# Patient Record
Sex: MALE | Race: WHITE | Employment: OTHER | URBAN - METROPOLITAN AREA
[De-identification: names, ages, dates, MRNs, and addresses within clinical notes are randomized per-mention and may not be internally consistent; named-entity substitution may affect disease eponyms.]

---

## 2017-06-13 ENCOUNTER — TELEPHONE (OUTPATIENT)
Dept: SURGERY | Age: 67
End: 2017-06-13

## 2017-08-10 ENCOUNTER — OFFICE VISIT (OUTPATIENT)
Dept: SURGERY | Age: 67
End: 2017-08-10

## 2017-08-10 ENCOUNTER — OFFICE VISIT (OUTPATIENT)
Dept: ORTHOPEDIC SURGERY | Age: 67
End: 2017-08-10

## 2017-08-10 VITALS — BODY MASS INDEX: 23.14 KG/M2 | WEIGHT: 190 LBS | HEIGHT: 76 IN | RESPIRATION RATE: 16 BRPM

## 2017-08-10 VITALS
WEIGHT: 190.04 LBS | HEIGHT: 76 IN | BODY MASS INDEX: 23.14 KG/M2 | DIASTOLIC BLOOD PRESSURE: 72 MMHG | SYSTOLIC BLOOD PRESSURE: 126 MMHG | HEART RATE: 76 BPM | RESPIRATION RATE: 16 BRPM

## 2017-08-10 DIAGNOSIS — L98.9 SKIN LESION: Primary | ICD-10-CM

## 2017-08-10 DIAGNOSIS — R52 PAIN: ICD-10-CM

## 2017-08-10 DIAGNOSIS — M75.81 ROTATOR CUFF TENDONITIS, RIGHT: Primary | ICD-10-CM

## 2017-08-10 PROCEDURE — 73010 X-RAY EXAM OF SHOULDER BLADE: CPT | Performed by: ORTHOPAEDIC SURGERY

## 2017-08-10 PROCEDURE — 3017F COLORECTAL CA SCREEN DOC REV: CPT | Performed by: ORTHOPAEDIC SURGERY

## 2017-08-10 PROCEDURE — 4004F PT TOBACCO SCREEN RCVD TLK: CPT | Performed by: ORTHOPAEDIC SURGERY

## 2017-08-10 PROCEDURE — 99204 OFFICE O/P NEW MOD 45 MIN: CPT | Performed by: ORTHOPAEDIC SURGERY

## 2017-08-10 PROCEDURE — G8420 CALC BMI NORM PARAMETERS: HCPCS | Performed by: SURGERY

## 2017-08-10 PROCEDURE — 4040F PNEUMOC VAC/ADMIN/RCVD: CPT | Performed by: ORTHOPAEDIC SURGERY

## 2017-08-10 PROCEDURE — 20610 DRAIN/INJ JOINT/BURSA W/O US: CPT | Performed by: ORTHOPAEDIC SURGERY

## 2017-08-10 PROCEDURE — 1123F ACP DISCUSS/DSCN MKR DOCD: CPT | Performed by: ORTHOPAEDIC SURGERY

## 2017-08-10 PROCEDURE — 4040F PNEUMOC VAC/ADMIN/RCVD: CPT | Performed by: SURGERY

## 2017-08-10 PROCEDURE — 4004F PT TOBACCO SCREEN RCVD TLK: CPT | Performed by: SURGERY

## 2017-08-10 PROCEDURE — G8420 CALC BMI NORM PARAMETERS: HCPCS | Performed by: ORTHOPAEDIC SURGERY

## 2017-08-10 PROCEDURE — G8427 DOCREV CUR MEDS BY ELIG CLIN: HCPCS | Performed by: SURGERY

## 2017-08-10 PROCEDURE — G8428 CUR MEDS NOT DOCUMENT: HCPCS | Performed by: ORTHOPAEDIC SURGERY

## 2017-08-10 PROCEDURE — 3017F COLORECTAL CA SCREEN DOC REV: CPT | Performed by: SURGERY

## 2017-08-10 PROCEDURE — 1123F ACP DISCUSS/DSCN MKR DOCD: CPT | Performed by: SURGERY

## 2017-08-10 PROCEDURE — 73030 X-RAY EXAM OF SHOULDER: CPT | Performed by: ORTHOPAEDIC SURGERY

## 2017-08-10 PROCEDURE — 99212 OFFICE O/P EST SF 10 MIN: CPT | Performed by: SURGERY

## 2017-08-10 ASSESSMENT — ENCOUNTER SYMPTOMS
BACK PAIN: 1
EYES NEGATIVE: 1
SHORTNESS OF BREATH: 1
GASTROINTESTINAL NEGATIVE: 1

## 2017-08-22 ENCOUNTER — HOSPITAL ENCOUNTER (OUTPATIENT)
Dept: PHYSICAL THERAPY | Age: 67
Discharge: HOME | End: 2017-08-31
Attending: ORTHOPAEDIC SURGERY | Admitting: ORTHOPAEDIC SURGERY

## 2017-08-22 ASSESSMENT — PAIN DESCRIPTION - LOCATION: LOCATION: SHOULDER

## 2017-08-22 ASSESSMENT — PAIN DESCRIPTION - ORIENTATION: ORIENTATION: RIGHT

## 2017-08-22 ASSESSMENT — PAIN DESCRIPTION - PROGRESSION: CLINICAL_PROGRESSION: GRADUALLY IMPROVING

## 2017-08-22 ASSESSMENT — PAIN DESCRIPTION - DESCRIPTORS: DESCRIPTORS: ACHING;SHARP

## 2017-08-22 ASSESSMENT — PAIN DESCRIPTION - PAIN TYPE: TYPE: CHRONIC PAIN

## 2017-08-22 ASSESSMENT — PAIN SCALES - GENERAL: PAINLEVEL_OUTOF10: 0

## 2017-08-22 ASSESSMENT — PAIN DESCRIPTION - FREQUENCY: FREQUENCY: INTERMITTENT

## 2017-08-31 ENCOUNTER — HOSPITAL ENCOUNTER (OUTPATIENT)
Dept: PHYSICAL THERAPY | Age: 67
Discharge: HOME | End: 2017-08-31
Admitting: ORTHOPAEDIC SURGERY

## 2017-09-01 ENCOUNTER — HOSPITAL ENCOUNTER (OUTPATIENT)
Dept: OTHER | Age: 67
Discharge: HOME | End: 2017-09-30
Attending: ORTHOPAEDIC SURGERY | Admitting: ORTHOPAEDIC SURGERY

## 2017-09-05 ENCOUNTER — HOSPITAL ENCOUNTER (OUTPATIENT)
Dept: PHYSICAL THERAPY | Age: 67
Discharge: HOME | End: 2017-09-05
Admitting: ORTHOPAEDIC SURGERY

## 2017-09-07 ENCOUNTER — HOSPITAL ENCOUNTER (OUTPATIENT)
Dept: PHYSICAL THERAPY | Age: 67
Discharge: HOME | End: 2017-09-07
Admitting: ORTHOPAEDIC SURGERY

## 2017-09-11 ENCOUNTER — HOSPITAL ENCOUNTER (OUTPATIENT)
Dept: PHYSICAL THERAPY | Age: 67
Discharge: HOME | End: 2017-09-11
Admitting: ORTHOPAEDIC SURGERY

## 2017-09-14 ENCOUNTER — HOSPITAL ENCOUNTER (OUTPATIENT)
Dept: PHYSICAL THERAPY | Age: 67
Discharge: HOME | End: 2017-09-14
Admitting: ORTHOPAEDIC SURGERY

## 2017-09-26 ENCOUNTER — OFFICE VISIT (OUTPATIENT)
Dept: ORTHOPEDIC SURGERY | Age: 67
End: 2017-09-26

## 2017-09-26 VITALS — HEIGHT: 75 IN | RESPIRATION RATE: 16 BRPM | WEIGHT: 190 LBS | BODY MASS INDEX: 23.62 KG/M2

## 2017-09-26 DIAGNOSIS — M75.101 TEAR OF RIGHT ROTATOR CUFF, UNSPECIFIED TEAR EXTENT: Primary | ICD-10-CM

## 2017-09-26 PROCEDURE — 1123F ACP DISCUSS/DSCN MKR DOCD: CPT | Performed by: ORTHOPAEDIC SURGERY

## 2017-09-26 PROCEDURE — 4040F PNEUMOC VAC/ADMIN/RCVD: CPT | Performed by: ORTHOPAEDIC SURGERY

## 2017-09-26 PROCEDURE — G8420 CALC BMI NORM PARAMETERS: HCPCS | Performed by: ORTHOPAEDIC SURGERY

## 2017-09-26 PROCEDURE — G8427 DOCREV CUR MEDS BY ELIG CLIN: HCPCS | Performed by: ORTHOPAEDIC SURGERY

## 2017-09-26 PROCEDURE — 4004F PT TOBACCO SCREEN RCVD TLK: CPT | Performed by: ORTHOPAEDIC SURGERY

## 2017-09-26 PROCEDURE — 3017F COLORECTAL CA SCREEN DOC REV: CPT | Performed by: ORTHOPAEDIC SURGERY

## 2017-09-26 PROCEDURE — 99213 OFFICE O/P EST LOW 20 MIN: CPT | Performed by: ORTHOPAEDIC SURGERY

## 2017-09-26 RX ORDER — TAMSULOSIN HYDROCHLORIDE 0.4 MG/1
CAPSULE ORAL
Refills: 6 | COMMUNITY
Start: 2017-08-30

## 2017-09-26 RX ORDER — CEFDINIR 300 MG/1
CAPSULE ORAL
Refills: 0 | COMMUNITY
Start: 2017-08-08 | End: 2019-02-28

## 2017-10-01 ENCOUNTER — HOSPITAL ENCOUNTER (OUTPATIENT)
Dept: OTHER | Age: 67
Discharge: HOME | End: 2017-10-31
Attending: ORTHOPAEDIC SURGERY | Admitting: ORTHOPAEDIC SURGERY

## 2017-10-11 ENCOUNTER — HOSPITAL ENCOUNTER (OUTPATIENT)
Dept: GENERAL RADIOLOGY | Age: 67
Discharge: HOME | End: 2017-10-11
Attending: ORTHOPAEDIC SURGERY | Admitting: ORTHOPAEDIC SURGERY

## 2017-10-11 ENCOUNTER — HOSPITAL ENCOUNTER (OUTPATIENT)
Dept: CT IMAGING | Age: 67
Discharge: HOME | End: 2017-10-11
Attending: ORTHOPAEDIC SURGERY | Admitting: ORTHOPAEDIC SURGERY

## 2017-10-11 DIAGNOSIS — M75.101 TEAR OF RIGHT ROTATOR CUFF, UNSPECIFIED TEAR EXTENT: ICD-10-CM

## 2017-10-31 ENCOUNTER — OFFICE VISIT (OUTPATIENT)
Dept: ORTHOPEDIC SURGERY | Age: 67
End: 2017-10-31

## 2017-10-31 VITALS — WEIGHT: 190 LBS | RESPIRATION RATE: 16 BRPM | HEIGHT: 75 IN | BODY MASS INDEX: 23.62 KG/M2

## 2017-10-31 DIAGNOSIS — M75.101 TEAR OF RIGHT ROTATOR CUFF, UNSPECIFIED TEAR EXTENT: Primary | ICD-10-CM

## 2017-10-31 PROCEDURE — 4004F PT TOBACCO SCREEN RCVD TLK: CPT | Performed by: ORTHOPAEDIC SURGERY

## 2017-10-31 PROCEDURE — 3017F COLORECTAL CA SCREEN DOC REV: CPT | Performed by: ORTHOPAEDIC SURGERY

## 2017-10-31 PROCEDURE — 4040F PNEUMOC VAC/ADMIN/RCVD: CPT | Performed by: ORTHOPAEDIC SURGERY

## 2017-10-31 PROCEDURE — 1123F ACP DISCUSS/DSCN MKR DOCD: CPT | Performed by: ORTHOPAEDIC SURGERY

## 2017-10-31 PROCEDURE — G8420 CALC BMI NORM PARAMETERS: HCPCS | Performed by: ORTHOPAEDIC SURGERY

## 2017-10-31 PROCEDURE — 99213 OFFICE O/P EST LOW 20 MIN: CPT | Performed by: ORTHOPAEDIC SURGERY

## 2017-10-31 PROCEDURE — G8427 DOCREV CUR MEDS BY ELIG CLIN: HCPCS | Performed by: ORTHOPAEDIC SURGERY

## 2017-10-31 PROCEDURE — G8484 FLU IMMUNIZE NO ADMIN: HCPCS | Performed by: ORTHOPAEDIC SURGERY

## 2017-10-31 RX ORDER — BUPROPION HYDROCHLORIDE 100 MG/1
TABLET, EXTENDED RELEASE ORAL
Refills: 5 | COMMUNITY
Start: 2017-10-23 | End: 2019-02-28

## 2017-10-31 RX ORDER — MEDICAL SUPPLY, MISCELLANEOUS
EACH MISCELLANEOUS
Refills: 3 | COMMUNITY
Start: 2017-10-25 | End: 2019-02-28

## 2017-10-31 RX ORDER — HYDROCODONE BITARTRATE AND ACETAMINOPHEN 5; 325 MG/1; MG/1
TABLET ORAL
Refills: 0 | COMMUNITY
Start: 2017-10-23 | End: 2018-01-09 | Stop reason: ALTCHOICE

## 2017-10-31 RX ORDER — INFLUENZA A VIRUS A/MICHIGAN/45/2015 X-275 (H1N1) ANTIGEN (FORMALDEHYDE INACTIVATED), INFLUENZA A VIRUS A/SINGAPORE/INFIMH-16-0019/2016 IVR-186 (H3N2) ANTIGEN (FORMALDEHYDE INACTIVATED), AND INFLUENZA B VIRUS B/MARYLAND/15/2016 BX-69A (A B/COLORADO/6/2017-LIKE VIRUS) ANTIGEN (FORMALDEHYDE INACTIVATED) 60; 60; 60 UG/.5ML; UG/.5ML; UG/.5ML
INJECTION, SUSPENSION INTRAMUSCULAR
Refills: 0 | COMMUNITY
Start: 2017-10-02 | End: 2019-02-28

## 2017-10-31 RX ORDER — FUROSEMIDE 20 MG/1
TABLET ORAL
Refills: 5 | COMMUNITY
Start: 2017-10-23

## 2017-10-31 RX ORDER — ROFLUMILAST 500 UG/1
TABLET ORAL
Refills: 5 | COMMUNITY
Start: 2017-10-23 | End: 2019-02-28

## 2017-10-31 NOTE — PROGRESS NOTES
Concern    None     Social History Narrative    None       Current Outpatient Prescriptions   Medication Sig Dispense Refill    CVS ASPIRIN EC 81 MG EC tablet TAKE 1 TABLET (81 MG) BY MOUTH DAILY  3    buPROPion (WELLBUTRIN SR) 100 MG extended release tablet TAKE 1 TABLET (100 MG) BY MOUTH DAILY IN THE MORNING  5    [facility]S VITAMIN D3 1000 units CAPS TAKE 1 CAPSULE (1,000 UNITS) BY MOUTH DAILY  3    furosemide (LASIX) 20 MG tablet TAKE 1 TABLET (20 MG) BY MOUTH DAILY  5    HYDROcodone-acetaminophen (NORCO) 5-325 MG per tablet TAKE 1 TABLET BY MOUTH EVERY DAY AS NEEDED FOR PAIN  0    FLUZONE HIGH-DOSE 0.5 ML KELLEN injection TO BE ADMINISTERED BY PHARMACIST FOR IMMUNIZATION  0    DALIRESP 500 MCG tablet TAKE 1 TABLET (500 MCG) BY MOUTH DAILY  5    cefdinir (OMNICEF) 300 MG capsule TAKE 1 CAPSULE (300 MG) BY MOUTH EVERY 12 HOURS FOR 10 DAYS  0    magnesium oxide (MAG-OX) 400 (241.3 Mg) MG TABS tablet TAKE 1 TABLET BY MOUTH EVERY DAY  3    tamsulosin (FLOMAX) 0.4 MG capsule TAKE 1 CAPSULE (0.4 MG) BY MOUTH ONCE A DAY AT BED TIME  6    naproxen (NAPROSYN) 500 MG tablet Take 500 mg by mouth 2 times daily (with meals)      calcium-vitamin D (OSCAL-500) 500-200 MG-UNIT per tablet Take 1 tablet by mouth daily      ipratropium-albuterol (DUONEB) 0.5-2.5 (3) MG/3ML SOLN nebulizer solution   Take 1 vial by nebulization every 4 hours   5    gabapentin (NEURONTIN) 300 MG capsule   Take 300 mg by mouth 2 times daily       multivitamin (THERAGRAN) per tablet   Take 1 tablet by mouth daily       PROAIR  (90 BASE) MCG/ACT inhaler   Inhale 2 puffs into the lungs every 4 hours as needed        No current facility-administered medications for this visit. Allergies   Allergen Reactions    Tramadol      \"It just makes me feel bad\".        Review of Systems:  See above      Physical Exam:   Resp 16   Ht 6' 3\" (1.905 m)   Wt 190 lb (86.2 kg)   BMI 23.75 kg/m²    He is in no acute distress, is awake, alert, oriented X 3, has appropriate mood and affect, and demonstrates normal gait and station. Right shoulder exam:  Skin:  Clear with no erythema, there is no significant joint effusion  Deformity:  none  Atrophy:  none  Tenderness:  lateral acromial  Active ROM:   FE:160    IR side: T12           ER side: 40   ER abduction: 60   IR abduction:  +10  Passive ROM is the same as active except FE is 170  ROM in the opposite shoulder is the same  Strength: FE:5-/5     ER:5-/5 IR:5/5      Elbow flexion:  5/5  Neer:  moderately positive  Alcocer:  moderately positive  Yergason:  mildly positive      The cervical spine does not have tenderness to palpation and there is not a Spurling's sign. Unless otherwise noted above there are no skin lesions, there is normal soft touch sensibility and 5/5 motor function in the C5-T1 distribution in the arm. There are bilateral +2 radial pulses and good capillary refill in the hands. Imaging review:   reviewed (2 views of the Right shoulder, 2V scaplula) and show  Slightly high riding humeral head, no fracture, no DJD      CT RIGHT SHOULDER 10/11/17      Impression   1. Full-thickness full width tears of the supraspinatus and infraspinatus   tendons retracted to the medial aspect of the humeral head. 2. Moderate acromioclavicular degenerative changes with mild subacromial   narrowing secondary to superior migration of the humeral head. 3. Mild glenohumeral degenerative changes. 4. No discrete labral tear or paralabral ganglion. 5. Nonvisualization of the intra-articular portion of the long head biceps   tendon raising the possibility of a tear and distal retraction. 6. Mild to moderate emphysematous changes.             Impression:  Right shoulder recurrent rotator cuff tear    Plan:  Natural history and expected course discussed. Questions answered.    We had a lengthy discussion about possible resurgery versus nonoperative management with physical therapy and periodic steroid injections. He thinks he would like to proceed with surgery, but would like to wait a few months.   Keep arm strong   Follow up  Dec-Luke to discuss possible surgery

## 2017-11-01 ENCOUNTER — HOSPITAL ENCOUNTER (OUTPATIENT)
Dept: OTHER | Age: 67
Discharge: HOME | End: 2017-11-30
Attending: ORTHOPAEDIC SURGERY | Admitting: ORTHOPAEDIC SURGERY

## 2018-12-05 ENCOUNTER — HOSPITAL ENCOUNTER (OUTPATIENT)
Age: 68
Discharge: HOME | End: 2018-12-05
Payer: COMMERCIAL

## 2018-12-05 ENCOUNTER — HOSPITAL ENCOUNTER (OUTPATIENT)
Dept: GENERAL RADIOLOGY | Age: 68
Discharge: HOME | End: 2018-12-05
Payer: COMMERCIAL

## 2018-12-05 DIAGNOSIS — R06.09 DOE (DYSPNEA ON EXERTION): ICD-10-CM

## 2018-12-05 DIAGNOSIS — J44.9 OBSTRUCTIVE CHRONIC BRONCHITIS WITHOUT EXACERBATION (HCC): ICD-10-CM

## 2018-12-05 LAB
ALBUMIN SERPL-MCNC: 4.5 GM/DL (ref 3.4–5)
ALP BLD-CCNC: 89 IU/L (ref 40–128)
ALT SERPL-CCNC: 26 U/L (ref 10–40)
ANION GAP SERPL CALCULATED.3IONS-SCNC: 17 MMOL/L (ref 4–16)
AST SERPL-CCNC: 31 IU/L (ref 15–37)
BASOPHILS ABSOLUTE: 0.1 K/CU MM
BASOPHILS RELATIVE PERCENT: 1.2 % (ref 0–1)
BILIRUB SERPL-MCNC: 0.5 MG/DL (ref 0–1)
BUN BLDV-MCNC: 27 MG/DL (ref 6–23)
CALCIUM SERPL-MCNC: 9.6 MG/DL (ref 8.3–10.6)
CHLORIDE BLD-SCNC: 101 MMOL/L (ref 99–110)
CO2: 24 MMOL/L (ref 21–32)
CREAT SERPL-MCNC: 0.8 MG/DL (ref 0.9–1.3)
D DIMER: <200 NG/ML(DDU)
DIFFERENTIAL TYPE: ABNORMAL
EOSINOPHILS ABSOLUTE: 0.3 K/CU MM
EOSINOPHILS RELATIVE PERCENT: 4.3 % (ref 0–3)
GFR AFRICAN AMERICAN: >60 ML/MIN/1.73M2
GFR NON-AFRICAN AMERICAN: >60 ML/MIN/1.73M2
GLUCOSE BLD-MCNC: 70 MG/DL (ref 70–99)
HCT VFR BLD CALC: 47.4 % (ref 42–52)
HEMOGLOBIN: 15.2 GM/DL (ref 13.5–18)
IMMATURE NEUTROPHIL %: 0.3 % (ref 0–0.43)
LYMPHOCYTES ABSOLUTE: 1 K/CU MM
LYMPHOCYTES RELATIVE PERCENT: 14.7 % (ref 24–44)
MCH RBC QN AUTO: 31.5 PG (ref 27–31)
MCHC RBC AUTO-ENTMCNC: 32.1 % (ref 32–36)
MCV RBC AUTO: 98.3 FL (ref 78–100)
MONOCYTES ABSOLUTE: 0.8 K/CU MM
MONOCYTES RELATIVE PERCENT: 12 % (ref 0–4)
NUCLEATED RBC %: 0 %
PDW BLD-RTO: 13.4 % (ref 11.7–14.9)
PLATELET # BLD: 250 K/CU MM (ref 140–440)
PMV BLD AUTO: 9.4 FL (ref 7.5–11.1)
POTASSIUM SERPL-SCNC: 5.3 MMOL/L (ref 3.5–5.1)
PRO-BNP: 89.69 PG/ML
RBC # BLD: 4.82 M/CU MM (ref 4.6–6.2)
SEGMENTED NEUTROPHILS ABSOLUTE COUNT: 4.7 K/CU MM
SEGMENTED NEUTROPHILS RELATIVE PERCENT: 67.5 % (ref 36–66)
SODIUM BLD-SCNC: 142 MMOL/L (ref 135–145)
TOTAL IMMATURE NEUTOROPHIL: 0.02 K/CU MM
TOTAL NUCLEATED RBC: 0 K/CU MM
TOTAL PROTEIN: 7 GM/DL (ref 6.4–8.2)
TSH HIGH SENSITIVITY: 1.23 UIU/ML (ref 0.27–4.2)
WBC # BLD: 6.9 K/CU MM (ref 4–10.5)

## 2018-12-05 PROCEDURE — 71046 X-RAY EXAM CHEST 2 VIEWS: CPT

## 2018-12-05 PROCEDURE — 84443 ASSAY THYROID STIM HORMONE: CPT

## 2018-12-05 PROCEDURE — 83880 ASSAY OF NATRIURETIC PEPTIDE: CPT

## 2018-12-05 PROCEDURE — 80053 COMPREHEN METABOLIC PANEL: CPT

## 2018-12-05 PROCEDURE — 85379 FIBRIN DEGRADATION QUANT: CPT

## 2018-12-05 PROCEDURE — 85025 COMPLETE CBC W/AUTO DIFF WBC: CPT

## 2018-12-05 PROCEDURE — 36415 COLL VENOUS BLD VENIPUNCTURE: CPT

## 2019-02-21 ENCOUNTER — HOSPITAL ENCOUNTER (OUTPATIENT)
Dept: ULTRASOUND IMAGING | Age: 69
Discharge: HOME | End: 2019-02-21
Payer: COMMERCIAL

## 2019-02-21 DIAGNOSIS — R60.0 LOCALIZED EDEMA: ICD-10-CM

## 2019-02-21 DIAGNOSIS — I83.12 VARICOSE VEINS OF BOTH LOWER EXTREMITIES WITH INFLAMMATION: ICD-10-CM

## 2019-02-21 DIAGNOSIS — I83.11 VARICOSE VEINS OF BOTH LOWER EXTREMITIES WITH INFLAMMATION: ICD-10-CM

## 2019-02-21 PROCEDURE — 93970 EXTREMITY STUDY: CPT

## 2019-02-28 ENCOUNTER — OFFICE VISIT (OUTPATIENT)
Dept: SURGERY | Age: 69
End: 2019-02-28
Payer: COMMERCIAL

## 2019-02-28 VITALS
BODY MASS INDEX: 25.06 KG/M2 | OXYGEN SATURATION: 92 % | DIASTOLIC BLOOD PRESSURE: 64 MMHG | WEIGHT: 205.8 LBS | RESPIRATION RATE: 16 BRPM | HEART RATE: 52 BPM | SYSTOLIC BLOOD PRESSURE: 142 MMHG | HEIGHT: 76 IN

## 2019-02-28 DIAGNOSIS — I73.9 PAD (PERIPHERAL ARTERY DISEASE) (HCC): Primary | ICD-10-CM

## 2019-02-28 DIAGNOSIS — I49.9 IRREGULAR HEART BEAT: ICD-10-CM

## 2019-02-28 PROCEDURE — 1123F ACP DISCUSS/DSCN MKR DOCD: CPT | Performed by: SURGERY

## 2019-02-28 PROCEDURE — 3017F COLORECTAL CA SCREEN DOC REV: CPT | Performed by: SURGERY

## 2019-02-28 PROCEDURE — 1036F TOBACCO NON-USER: CPT | Performed by: SURGERY

## 2019-02-28 PROCEDURE — G8484 FLU IMMUNIZE NO ADMIN: HCPCS | Performed by: SURGERY

## 2019-02-28 PROCEDURE — G8419 CALC BMI OUT NRM PARAM NOF/U: HCPCS | Performed by: SURGERY

## 2019-02-28 PROCEDURE — G8427 DOCREV CUR MEDS BY ELIG CLIN: HCPCS | Performed by: SURGERY

## 2019-02-28 PROCEDURE — 4040F PNEUMOC VAC/ADMIN/RCVD: CPT | Performed by: SURGERY

## 2019-02-28 PROCEDURE — 99204 OFFICE O/P NEW MOD 45 MIN: CPT | Performed by: SURGERY

## 2019-02-28 RX ORDER — TIZANIDINE 2 MG/1
2 TABLET ORAL EVERY 6 HOURS PRN
COMMUNITY

## 2019-02-28 NOTE — PROGRESS NOTES
Mother         cervical    Other Mother         lung problems    Cancer Father         bone marrow    Early Death Brother         GSW    Arthritis Brother      Social History     Socioeconomic History    Marital status: Single     Spouse name: Not on file    Number of children: Not on file    Years of education: Not on file    Highest education level: Not on file   Occupational History    Not on file   Social Needs    Financial resource strain: Not on file    Food insecurity:     Worry: Not on file     Inability: Not on file    Transportation needs:     Medical: Not on file     Non-medical: Not on file   Tobacco Use    Smoking status: Former Smoker     Packs/day: 1.00     Years: 40.00     Pack years: 40.00    Smokeless tobacco: Never Used   Substance and Sexual Activity    Alcohol use: Yes     Alcohol/week: 0.6 oz     Types: 1 Cans of beer per week     Comment: few beers in the evening. CAFFEINE: 1-2 cups coffee daily.     Drug use: No    Sexual activity: Yes     Partners: Female   Lifestyle    Physical activity:     Days per week: Not on file     Minutes per session: Not on file    Stress: Not on file   Relationships    Social connections:     Talks on phone: Not on file     Gets together: Not on file     Attends Muslim service: Not on file     Active member of club or organization: Not on file     Attends meetings of clubs or organizations: Not on file     Relationship status: Not on file    Intimate partner violence:     Fear of current or ex partner: Not on file     Emotionally abused: Not on file     Physically abused: Not on file     Forced sexual activity: Not on file   Other Topics Concern    Not on file   Social History Narrative    Not on file       Current Outpatient Medications   Medication Sig Dispense Refill    tiZANidine (ZANAFLEX) 2 MG tablet Take 2 mg by mouth every 6 hours as needed      B Complex-Biotin-FA ([facility]S BALANCED B-100) TABS TAKE 1 TABLET (100 MG) BY MOUTH Respiratory: Negative for apnea, choking and stridor. Cardiovascular: Negative for chest pain and palpitations. Gastrointestinal: Negative for anal bleeding, constipation and rectal pain. Endocrine: Negative for polydipsia. Genitourinary: Negative for enuresis, flank pain and hematuria. Musculoskeletal: Positive for gait problem and joint swelling. Negative for back pain and myalgias. Skin: Negative for color change and pallor. Allergic/Immunologic: Negative for environmental allergies. Neurological: Negative for syncope and speech difficulty. Psychiatric/Behavioral: Negative for confusion and hallucinations. OBJECTIVE:  Physical Exam:    BP (!) 142/64 (Site: Left Upper Arm, Position: Sitting, Cuff Size: Medium Adult)   Pulse 52   Resp 16   Ht 6' 4\" (1.93 m)   Wt 205 lb 12.8 oz (93.4 kg)   SpO2 92%   BMI 25.05 kg/m²      Physical Exam   Constitutional: He is oriented to person, place, and time. He appears well-developed and well-nourished. HENT:   Head: Normocephalic. Eyes: Pupils are equal, round, and reactive to light. Neck: Normal range of motion. Neck supple. Cardiovascular: Normal rate. Pulmonary/Chest: Effort normal.   Abdominal: Soft. He exhibits no distension and no mass. There is no tenderness. There is no rebound and no guarding. Musculoskeletal: Normal range of motion. Right shoulder: He exhibits swelling. Right knee: He exhibits swelling. Left knee: He exhibits swelling. Neurological: He is alert and oriented to person, place, and time. Skin: Skin is warm. Psychiatric: He has a normal mood and affect. ASSESSMENT:  1. PAD (peripheral artery disease) ([facility] [address]    2. Irregular heart beat          PLAN:  Treatment:  Pt needs vascular evaluation for venous stasis. Will also refer to cardiology for poss CHF. Patient counseled on risks, benefits, and alternatives of treatment plan at length today.  Patient states an understanding and willingness to proceed with plan. Orders Placed This Encounter   Procedures   Kvng Schmitz MD, Cardiology, [place]        No orders of the defined types were placed in this encounter. Follow Up:  No follow-ups on file.       Winifred Hartman MD

## 2019-03-01 ENCOUNTER — TELEPHONE (OUTPATIENT)
Dept: CARDIOLOGY CLINIC | Age: 69
End: 2019-03-01

## 2019-03-06 ENCOUNTER — OFFICE VISIT (OUTPATIENT)
Dept: PULMONOLOGY | Age: 69
End: 2019-03-06
Payer: COMMERCIAL

## 2019-03-06 VITALS — HEART RATE: 68 BPM | OXYGEN SATURATION: 96 %

## 2019-03-06 DIAGNOSIS — Z87.891 FORMER SMOKER: ICD-10-CM

## 2019-03-06 DIAGNOSIS — J43.9 PULMONARY EMPHYSEMA, UNSPECIFIED EMPHYSEMA TYPE (HCC): ICD-10-CM

## 2019-03-06 DIAGNOSIS — J44.9 COPD, SEVERE (HCC): ICD-10-CM

## 2019-03-06 DIAGNOSIS — R06.02 SHORTNESS OF BREATH: Primary | ICD-10-CM

## 2019-03-06 LAB
EXPIRATORY TIME-POST: NORMAL SEC
EXPIRATORY TIME: NORMAL SEC
FEF 25-75% %CHNG: NORMAL
FEF 25-75% %PRED-POST: NORMAL %
FEF 25-75% %PRED-PRE: NORMAL L/SEC
FEF 25-75% PRED: NORMAL L/SEC
FEF 25-75%-POST: NORMAL L/SEC
FEF 25-75%-PRE: NORMAL L/SEC
FEV1 %PRED-POST: 30 %
FEV1 %PRED-PRE: 34 %
FEV1 PRED: 4.12 L
FEV1-POST: 1.24 L
FEV1-PRE: 1.38 L
FEV1/FVC %PRED-POST: 60 %
FEV1/FVC %PRED-PRE: 61 %
FEV1/FVC PRED: 74 %
FEV1/FVC-POST: 44.4 %
FEV1/FVC-PRE: 45.3 %
FVC %PRED-POST: 51 L
FVC %PRED-PRE: 55 %
FVC PRED: 5.55 L
FVC-POST: 2.8 L
FVC-PRE: 3.05 L
PEF %PRED-POST: NORMAL %
PEF %PRED-PRE: NORMAL L/SEC
PEF PRED: NORMAL L/SEC
PEF%CHNG: NORMAL
PEF-POST: NORMAL L/SEC
PEF-PRE: NORMAL L/SEC

## 2019-03-06 PROCEDURE — 99203 OFFICE O/P NEW LOW 30 MIN: CPT | Performed by: INTERNAL MEDICINE

## 2019-03-06 PROCEDURE — 3023F SPIROM DOC REV: CPT | Performed by: INTERNAL MEDICINE

## 2019-03-06 PROCEDURE — G8419 CALC BMI OUT NRM PARAM NOF/U: HCPCS | Performed by: INTERNAL MEDICINE

## 2019-03-06 PROCEDURE — 1123F ACP DISCUSS/DSCN MKR DOCD: CPT | Performed by: INTERNAL MEDICINE

## 2019-03-06 PROCEDURE — 1036F TOBACCO NON-USER: CPT | Performed by: INTERNAL MEDICINE

## 2019-03-06 PROCEDURE — 1101F PT FALLS ASSESS-DOCD LE1/YR: CPT | Performed by: INTERNAL MEDICINE

## 2019-03-06 PROCEDURE — 3017F COLORECTAL CA SCREEN DOC REV: CPT | Performed by: INTERNAL MEDICINE

## 2019-03-06 PROCEDURE — G8484 FLU IMMUNIZE NO ADMIN: HCPCS | Performed by: INTERNAL MEDICINE

## 2019-03-06 PROCEDURE — 4040F PNEUMOC VAC/ADMIN/RCVD: CPT | Performed by: INTERNAL MEDICINE

## 2019-03-06 PROCEDURE — G8926 SPIRO NO PERF OR DOC: HCPCS | Performed by: INTERNAL MEDICINE

## 2019-03-06 PROCEDURE — G8427 DOCREV CUR MEDS BY ELIG CLIN: HCPCS | Performed by: INTERNAL MEDICINE

## 2019-03-06 RX ORDER — ALBUTEROL SULFATE 90 UG/1
2 AEROSOL, METERED RESPIRATORY (INHALATION) EVERY 6 HOURS PRN
Qty: 1 INHALER | Refills: 11 | Status: SHIPPED | OUTPATIENT
Start: 2019-03-06 | End: 2019-12-30 | Stop reason: SDUPTHER

## 2019-03-06 RX ORDER — ALBUTEROL SULFATE 90 UG/1
2 AEROSOL, METERED RESPIRATORY (INHALATION) EVERY 6 HOURS PRN
COMMUNITY
End: 2019-03-13

## 2019-03-06 ASSESSMENT — PULMONARY FUNCTION TESTS
FVC_POST: 2.80
FEV1_PERCENT_PREDICTED_PRE: 34
FEV1/FVC_PRE: 45.3
FVC_PRE: 3.05
FEV1/FVC_POST: 44.4
FEV1_POST: 1.24
FEV1_PERCENT_PREDICTED_POST: 30
FEV1_PRE: 1.38
FEV1/FVC_PREDICTED: 74.0
FEV1/FVC_PERCENT_PREDICTED_PRE: 61
FVC_PERCENT_PREDICTED_PRE: 55
FEV1_PREDICTED: 4.12
FVC_PREDICTED: 5.55
FEV1/FVC_PERCENT_PREDICTED_POST: 60
FVC_PERCENT_PREDICTED_POST: 51

## 2019-03-07 DIAGNOSIS — J43.9 PULMONARY EMPHYSEMA, UNSPECIFIED EMPHYSEMA TYPE (HCC): ICD-10-CM

## 2019-03-07 DIAGNOSIS — Z87.891 FORMER SMOKER: ICD-10-CM

## 2019-03-07 DIAGNOSIS — R06.02 SHORTNESS OF BREATH: ICD-10-CM

## 2019-03-07 PROCEDURE — 94060 EVALUATION OF WHEEZING: CPT | Performed by: INTERNAL MEDICINE

## 2019-03-08 ENCOUNTER — HOSPITAL ENCOUNTER (OUTPATIENT)
Age: 69
Discharge: HOME | End: 2019-03-08
Payer: COMMERCIAL

## 2019-03-08 ENCOUNTER — HOSPITAL ENCOUNTER (OUTPATIENT)
Dept: GENERAL RADIOLOGY | Age: 69
Discharge: HOME | End: 2019-03-08
Payer: COMMERCIAL

## 2019-03-08 DIAGNOSIS — Z87.891 FORMER SMOKER: ICD-10-CM

## 2019-03-08 DIAGNOSIS — R06.02 SHORTNESS OF BREATH: ICD-10-CM

## 2019-03-08 DIAGNOSIS — J43.9 PULMONARY EMPHYSEMA, UNSPECIFIED EMPHYSEMA TYPE (HCC): ICD-10-CM

## 2019-03-08 LAB
ANION GAP SERPL CALCULATED.3IONS-SCNC: 10 MMOL/L (ref 4–16)
BUN BLDV-MCNC: 18 MG/DL (ref 6–23)
CALCIUM SERPL-MCNC: 9.3 MG/DL (ref 8.3–10.6)
CHLORIDE BLD-SCNC: 102 MMOL/L (ref 99–110)
CO2: 30 MMOL/L (ref 21–32)
CREAT SERPL-MCNC: 0.8 MG/DL (ref 0.9–1.3)
D DIMER: 227 NG/ML(DDU)
GFR AFRICAN AMERICAN: >60 ML/MIN/1.73M2
GFR NON-AFRICAN AMERICAN: >60 ML/MIN/1.73M2
GLUCOSE BLD-MCNC: 96 MG/DL (ref 70–99)
POTASSIUM SERPL-SCNC: 4.7 MMOL/L (ref 3.5–5.1)
PRO-BNP: 190.8 PG/ML
SODIUM BLD-SCNC: 142 MMOL/L (ref 135–145)

## 2019-03-08 PROCEDURE — 85379 FIBRIN DEGRADATION QUANT: CPT

## 2019-03-08 PROCEDURE — 83880 ASSAY OF NATRIURETIC PEPTIDE: CPT

## 2019-03-08 PROCEDURE — 71046 X-RAY EXAM CHEST 2 VIEWS: CPT

## 2019-03-08 PROCEDURE — 36415 COLL VENOUS BLD VENIPUNCTURE: CPT

## 2019-03-08 PROCEDURE — 80048 BASIC METABOLIC PNL TOTAL CA: CPT

## 2019-03-13 ENCOUNTER — INITIAL CONSULT (OUTPATIENT)
Dept: CARDIOLOGY CLINIC | Age: 69
End: 2019-03-13
Payer: COMMERCIAL

## 2019-03-13 VITALS
DIASTOLIC BLOOD PRESSURE: 82 MMHG | SYSTOLIC BLOOD PRESSURE: 142 MMHG | HEART RATE: 68 BPM | RESPIRATION RATE: 24 BRPM | WEIGHT: 208.2 LBS | BODY MASS INDEX: 25.34 KG/M2

## 2019-03-13 DIAGNOSIS — I49.9 IRREGULAR HEART RATE: Primary | ICD-10-CM

## 2019-03-13 DIAGNOSIS — R06.02 SHORTNESS OF BREATH: ICD-10-CM

## 2019-03-13 DIAGNOSIS — I73.9 CLAUDICATION IN PERIPHERAL VASCULAR DISEASE (HCC): ICD-10-CM

## 2019-03-13 PROCEDURE — 1101F PT FALLS ASSESS-DOCD LE1/YR: CPT | Performed by: INTERNAL MEDICINE

## 2019-03-13 PROCEDURE — G8419 CALC BMI OUT NRM PARAM NOF/U: HCPCS | Performed by: INTERNAL MEDICINE

## 2019-03-13 PROCEDURE — 93000 ELECTROCARDIOGRAM COMPLETE: CPT | Performed by: INTERNAL MEDICINE

## 2019-03-13 PROCEDURE — 99213 OFFICE O/P EST LOW 20 MIN: CPT | Performed by: INTERNAL MEDICINE

## 2019-03-13 PROCEDURE — G8484 FLU IMMUNIZE NO ADMIN: HCPCS | Performed by: INTERNAL MEDICINE

## 2019-03-13 PROCEDURE — G8427 DOCREV CUR MEDS BY ELIG CLIN: HCPCS | Performed by: INTERNAL MEDICINE

## 2019-03-13 PROCEDURE — 3017F COLORECTAL CA SCREEN DOC REV: CPT | Performed by: INTERNAL MEDICINE

## 2019-03-13 RX ORDER — DEXTRIN 3 G/3.8 G
POWDER (GRAM) ORAL
Refills: 3 | COMMUNITY
Start: 2018-12-26

## 2019-03-13 RX ORDER — PENTOXIFYLLINE 400 MG/1
TABLET, EXTENDED RELEASE ORAL
Refills: 2 | COMMUNITY
Start: 2019-02-12

## 2019-03-13 RX ORDER — POTASSIUM CHLORIDE 750 MG/1
CAPSULE, EXTENDED RELEASE ORAL
Refills: 2 | COMMUNITY
Start: 2019-02-04

## 2019-03-15 ENCOUNTER — HOSPITAL ENCOUNTER (OUTPATIENT)
Age: 69
Discharge: HOME | End: 2019-03-15
Payer: COMMERCIAL

## 2019-03-15 ENCOUNTER — HOSPITAL ENCOUNTER (OUTPATIENT)
Dept: PULMONOLOGY | Age: 69
Discharge: HOME | End: 2019-03-15
Payer: COMMERCIAL

## 2019-03-15 LAB
BASE EXCESS MIXED: ABNORMAL (ref 0–1.2)
CARBON MONOXIDE, BLOOD: 2.9 % (ref 0–5)
CO2 CONTENT: 28.7 MMOL/L (ref 19–24)
COMMENT: ABNORMAL
HCO3 ARTERIAL: 27.6 MMOL/L (ref 18–23)
METHEMOGLOBIN ARTERIAL: 0.9 %
O2 SATURATION: 92.5 % (ref 96–97)
PCO2 ARTERIAL: 37 MMHG (ref 32–45)
PH BLOOD: 7.48 (ref 7.34–7.45)
PO2 ARTERIAL: 68 MMHG (ref 75–100)

## 2019-03-15 PROCEDURE — 82803 BLOOD GASES ANY COMBINATION: CPT

## 2019-03-15 PROCEDURE — 36600 WITHDRAWAL OF ARTERIAL BLOOD: CPT

## 2019-03-29 ENCOUNTER — PROCEDURE VISIT (OUTPATIENT)
Dept: CARDIOLOGY CLINIC | Age: 69
End: 2019-03-29
Payer: COMMERCIAL

## 2019-03-29 ENCOUNTER — TELEPHONE (OUTPATIENT)
Dept: CARDIOLOGY CLINIC | Age: 69
End: 2019-03-29

## 2019-03-29 DIAGNOSIS — I49.9 IRREGULAR HEART RATE: ICD-10-CM

## 2019-03-29 DIAGNOSIS — R06.02 SHORTNESS OF BREATH: Primary | ICD-10-CM

## 2019-03-29 DIAGNOSIS — I73.9 CLAUDICATION IN PERIPHERAL VASCULAR DISEASE (HCC): ICD-10-CM

## 2019-03-29 LAB
LV EF: 58 %
LVEF MODALITY: NORMAL

## 2019-03-29 PROCEDURE — 93306 TTE W/DOPPLER COMPLETE: CPT | Performed by: INTERNAL MEDICINE

## 2019-04-01 ENCOUNTER — TELEPHONE (OUTPATIENT)
Dept: CARDIOLOGY CLINIC | Age: 69
End: 2019-04-01

## 2019-04-01 ENCOUNTER — PROCEDURE VISIT (OUTPATIENT)
Dept: CARDIOLOGY CLINIC | Age: 69
End: 2019-04-01
Payer: COMMERCIAL

## 2019-04-01 DIAGNOSIS — R06.02 SHORTNESS OF BREATH: ICD-10-CM

## 2019-04-01 DIAGNOSIS — I73.9 CLAUDICATION IN PERIPHERAL VASCULAR DISEASE (HCC): ICD-10-CM

## 2019-04-01 DIAGNOSIS — I49.9 IRREGULAR HEART RATE: ICD-10-CM

## 2019-04-01 LAB
LV EF: 60 %
LVEF MODALITY: NORMAL

## 2019-04-01 PROCEDURE — 78452 HT MUSCLE IMAGE SPECT MULT: CPT | Performed by: INTERNAL MEDICINE

## 2019-04-01 PROCEDURE — A9500 TC99M SESTAMIBI: HCPCS | Performed by: INTERNAL MEDICINE

## 2019-04-01 PROCEDURE — 93018 CV STRESS TEST I&R ONLY: CPT | Performed by: INTERNAL MEDICINE

## 2019-04-01 PROCEDURE — 93016 CV STRESS TEST SUPVJ ONLY: CPT | Performed by: INTERNAL MEDICINE

## 2019-04-01 PROCEDURE — 93017 CV STRESS TEST TRACING ONLY: CPT | Performed by: INTERNAL MEDICINE

## 2019-04-01 NOTE — TELEPHONE ENCOUNTER
Advised patient of echo results. Patient will be here later today for NM test.  Will call patient with those results as soon as they become available. Patient voiced understanding. Left ventricular systolic function is normal with an ejection fraction of   55-60%.  Grade I diastolic dysfunction.   No significant valvular disease noted.   No evidence of pericardial effusion.

## 2019-04-02 ENCOUNTER — TELEPHONE (OUTPATIENT)
Dept: PULMONOLOGY | Age: 69
End: 2019-04-02

## 2019-04-02 ENCOUNTER — TELEPHONE (OUTPATIENT)
Dept: CARDIOLOGY CLINIC | Age: 69
End: 2019-04-02

## 2019-04-03 ENCOUNTER — PROCEDURE VISIT (OUTPATIENT)
Dept: CARDIOLOGY CLINIC | Age: 69
End: 2019-04-03

## 2019-04-03 DIAGNOSIS — I49.9 IRREGULAR HEART RATE: ICD-10-CM

## 2019-04-03 DIAGNOSIS — R06.02 SHORTNESS OF BREATH: ICD-10-CM

## 2019-04-03 DIAGNOSIS — I73.9 CLAUDICATION IN PERIPHERAL VASCULAR DISEASE (HCC): Primary | ICD-10-CM

## 2019-04-04 ENCOUNTER — TELEPHONE (OUTPATIENT)
Dept: CARDIOLOGY CLINIC | Age: 69
End: 2019-04-04

## 2019-04-18 ENCOUNTER — OFFICE VISIT (OUTPATIENT)
Dept: PULMONOLOGY | Age: 69
End: 2019-04-18
Payer: COMMERCIAL

## 2019-04-18 VITALS
DIASTOLIC BLOOD PRESSURE: 82 MMHG | HEIGHT: 76 IN | SYSTOLIC BLOOD PRESSURE: 124 MMHG | OXYGEN SATURATION: 96 % | HEART RATE: 92 BPM | WEIGHT: 200.2 LBS | BODY MASS INDEX: 24.38 KG/M2

## 2019-04-18 DIAGNOSIS — R06.02 SHORTNESS OF BREATH: ICD-10-CM

## 2019-04-18 DIAGNOSIS — J43.9 PULMONARY EMPHYSEMA, UNSPECIFIED EMPHYSEMA TYPE (HCC): ICD-10-CM

## 2019-04-18 DIAGNOSIS — J44.9 COPD, SEVERE (HCC): Primary | ICD-10-CM

## 2019-04-18 DIAGNOSIS — G47.34 NOCTURNAL HYPOXEMIA: ICD-10-CM

## 2019-04-18 DIAGNOSIS — Z87.891 FORMER SMOKER: ICD-10-CM

## 2019-04-18 PROCEDURE — 1123F ACP DISCUSS/DSCN MKR DOCD: CPT | Performed by: INTERNAL MEDICINE

## 2019-04-18 PROCEDURE — 99213 OFFICE O/P EST LOW 20 MIN: CPT | Performed by: INTERNAL MEDICINE

## 2019-04-18 PROCEDURE — 3023F SPIROM DOC REV: CPT | Performed by: INTERNAL MEDICINE

## 2019-04-18 PROCEDURE — 3017F COLORECTAL CA SCREEN DOC REV: CPT | Performed by: INTERNAL MEDICINE

## 2019-04-18 PROCEDURE — G8926 SPIRO NO PERF OR DOC: HCPCS | Performed by: INTERNAL MEDICINE

## 2019-04-18 PROCEDURE — G8420 CALC BMI NORM PARAMETERS: HCPCS | Performed by: INTERNAL MEDICINE

## 2019-04-18 PROCEDURE — 4040F PNEUMOC VAC/ADMIN/RCVD: CPT | Performed by: INTERNAL MEDICINE

## 2019-04-18 PROCEDURE — G8427 DOCREV CUR MEDS BY ELIG CLIN: HCPCS | Performed by: INTERNAL MEDICINE

## 2019-04-18 PROCEDURE — 1036F TOBACCO NON-USER: CPT | Performed by: INTERNAL MEDICINE

## 2019-04-18 RX ORDER — BUDESONIDE AND FORMOTEROL FUMARATE DIHYDRATE 160; 4.5 UG/1; UG/1
2 AEROSOL RESPIRATORY (INHALATION) EVERY 12 HOURS
Qty: 1 INHALER | Refills: 11 | Status: SHIPPED | OUTPATIENT
Start: 2019-04-18 | End: 2020-03-30 | Stop reason: SDUPTHER

## 2019-04-18 NOTE — PROGRESS NOTES
SUBJECTIVE:  Chief Complaint: Severe COPD, pulmonary emphysema, shortness of breath, nocturnal hypoxemia, former smoker  Phillip Riggs continues to complain of severe shortness of breath even at rest. His shortness of breath prevents him from doing many physical activities. He denies chest pain or chest discomfort. He tolerated his Symbicort well and continues to use it. He also continues on DuoNeb at least 3 times a day per nebulizer and has a rescue albuterol inhaler. He underwent a nighttime oxygen saturation trending and demonstrated a need for oxygen at nighttime. He no longer smokes and it has no secondary smoke exposure. OBJECTIVE:  /82   Pulse 92   Ht 6' 4\" (1.93 m)   Wt 200 lb 3.2 oz (90.8 kg)   SpO2 96%   BMI 24.37 kg/m²      Physical Exam:  Constitutional:  He appears well developed and well-nourished. Not using accessory muscles respiration  Neck:  Supple, No palpable lymphadenopathy, No JVD  Cardiovascular:  S1, S2 Normal, Regular rhythm, no murmurs or gallops, No pericardial  rubs. Pulmonary:  Diminished breath sounds throughout all lung fields without wheezing or rhonchi  Abdomen: Not examined  Extremities: no edema, No DVT  Neurologic:  Awake and Alert, No focal deficits    Radiology: Chest x-ray on 3/8/19 demonstrates stable COPD changes  PFT: Office spirometry on 3/6/19 demonstrated a severe obstructive lung defect. Following bronchodilator he had no specific response        ASSESSMENT:    1. COPD, severe ([address]    2. Pulmonary emphysema, unspecified emphysema type ([address]    3. Shortness of breath    4. Former smoker    5. Nocturnal hypoxemia          PLAN:   He does qualify for nocturnal oxygen and I encouraged him to use this and should benefit from it for treatment of his severe COPD. I did renew his Symbicort 160/4.5 2 puffs twice a day and then gargle and encouraged him to use his nebulizer up to 4 times a day with DuoNeb.   I also recommended pulmonary rehab to improve his exercise capacity. I encouraged him to get up-to-date with his vaccinations and obtain Prevnar 13. I will continue to follow him  Return in about 4 months (around 8/18/2019) for Recheck for COPD, Recheck for Shortness of Breath, Nocturnal hypoxemia. This dictation was performed with a verbal recognition program and it was checked for errors. It is possible that there are still dictated errors within this office note. Any errors should be brought immediately to my attention for correction. All efforts were made to ensure that this office note is accurate.

## 2019-04-29 ASSESSMENT — ENCOUNTER SYMPTOMS
CONSTIPATION: 0
SORE THROAT: 0
BACK PAIN: 0
EYE REDNESS: 0
RECTAL PAIN: 0
EYE ITCHING: 0
PHOTOPHOBIA: 0
CHOKING: 0
STRIDOR: 0
APNEA: 0
ANAL BLEEDING: 0
COLOR CHANGE: 0

## 2019-05-07 ENCOUNTER — TELEPHONE (OUTPATIENT)
Dept: PULMONOLOGY | Age: 69
End: 2019-05-07

## 2019-05-09 ENCOUNTER — OFFICE VISIT (OUTPATIENT)
Dept: CARDIOLOGY CLINIC | Age: 69
End: 2019-05-09
Payer: COMMERCIAL

## 2019-05-09 VITALS
BODY MASS INDEX: 26.95 KG/M2 | HEIGHT: 72 IN | DIASTOLIC BLOOD PRESSURE: 82 MMHG | WEIGHT: 199 LBS | HEART RATE: 80 BPM | SYSTOLIC BLOOD PRESSURE: 134 MMHG

## 2019-05-09 DIAGNOSIS — M79.89 SWELLING OF EXTREMITY: Primary | ICD-10-CM

## 2019-05-09 DIAGNOSIS — I49.9 IRREGULAR HEART RATE: ICD-10-CM

## 2019-05-09 PROCEDURE — G8419 CALC BMI OUT NRM PARAM NOF/U: HCPCS | Performed by: INTERNAL MEDICINE

## 2019-05-09 PROCEDURE — 3017F COLORECTAL CA SCREEN DOC REV: CPT | Performed by: INTERNAL MEDICINE

## 2019-05-09 PROCEDURE — 99214 OFFICE O/P EST MOD 30 MIN: CPT | Performed by: INTERNAL MEDICINE

## 2019-05-09 PROCEDURE — G8427 DOCREV CUR MEDS BY ELIG CLIN: HCPCS | Performed by: INTERNAL MEDICINE

## 2019-05-09 PROCEDURE — 1123F ACP DISCUSS/DSCN MKR DOCD: CPT | Performed by: INTERNAL MEDICINE

## 2019-05-09 PROCEDURE — 93000 ELECTROCARDIOGRAM COMPLETE: CPT | Performed by: INTERNAL MEDICINE

## 2019-05-09 PROCEDURE — 1036F TOBACCO NON-USER: CPT | Performed by: INTERNAL MEDICINE

## 2019-05-09 PROCEDURE — 4040F PNEUMOC VAC/ADMIN/RCVD: CPT | Performed by: INTERNAL MEDICINE

## 2019-05-09 ASSESSMENT — ENCOUNTER SYMPTOMS: SHORTNESS OF BREATH: 1

## 2019-05-09 NOTE — PROGRESS NOTES
TABS TAKE 1 TABLET (100 MG) BY MOUTH DAILY WITH A MEAL  3    clotrimazole-betamethasone (LOTRISONE) 1-0.05 % cream APPLY TOPICALLY TO AFFECTED AREA 2 TIMES PER DAY  1    mirtazapine (REMERON) 15 MG tablet TAKE 1 TABLET (15 MG) BY MOUTH DAILY AT BEDTIME  5    naproxen (NAPROSYN) 500 MG tablet Take 1 tablet by mouth 2 times daily 60 tablet 0    CVS VITAMIN D3 1000 units CAPS TAKE 1 CAPSULE (1,000 UNITS) BY MOUTH DAILY  3    furosemide (LASIX) 20 MG tablet TAKE 1 TABLET (20 MG) BY MOUTH DAILY  5    magnesium oxide (MAG-OX) 400 (241.3 Mg) MG TABS tablet TAKE 1 TABLET BY MOUTH EVERY DAY  3    tamsulosin (FLOMAX) 0.4 MG capsule TAKE 1 CAPSULE (0.4 MG) BY MOUTH ONCE A DAY AT BED TIME  6    calcium-vitamin D (OSCAL-500) 500-200 MG-UNIT per tablet Take 1 tablet by mouth daily      ipratropium-albuterol (DUONEB) 0.5-2.5 (3) MG/3ML SOLN nebulizer solution   Take 1 vial by nebulization every 4 hours   5    multivitamin (THERAGRAN) per tablet   Take 1 tablet by mouth daily        No current facility-administered medications for this visit. Allergies: Tramadol  Past Medical History:   Diagnosis Date    Arthritis     Back    Asthma     Cancer ([facility] [address]     colon    Chronic bronchitis (HCC)     COPD (chronic obstructive pulmonary disease) (HCC)     COPD, severe (HCC) 3/6/2019    Emphysema     Emphysema of lung ([facility] [address] 3/6/2019    Former smoker 3/6/2019    H/O Doppler lower arterial ultrasound 04/03/2019    No evidence of significant occlusive arterial disease, normal arterial flow.  H/O echocardiogram 08/20/15    EF 60% Normal LV and systolic function. Mild MR & TR.    H/O echocardiogram 4/27/15    Normal LV size with grossly normal function, Mild diastolic dysfunction, mild MR, pulmonary regurgitation Mild TR subacute bacterial endocarditis prophylaxis is not required.      Hx of cardiovascular stress test 04/01/2019    EF 60%, Normal    Hx of exercise stress test 01/08/2014    Normal    Neuropathy     Nocturnal hypoxemia 4/18/2019    Radiation 2005    with chemo for colon cancer    Shortness of breath 3/6/2019     Past Surgical History:   Procedure Laterality Date    COLECTOMY  2003 & 2004    COLOSTOMY      FRACTURE SURGERY  1969    (L) leg    LUNG SURGERY  Early 2000s    AV malformation (coil placed)    OTHER SURGICAL HISTORY Right 5/22/13    Rotator cuff repair      As reviewed   Family History   Problem Relation Age of Onset    Cancer Mother         cervical    Other Mother         lung problems    Cancer Father         bone marrow    Early Death Brother         GSW    Arthritis Brother      Social History     Tobacco Use    Smoking status: Former Smoker     Packs/day: 1.00     Years: 40.00     Pack years: 40.00    Smokeless tobacco: Never Used   Substance Use Topics    Alcohol use: Yes     Alcohol/week: 0.6 oz     Types: 1 Cans of beer per week     Comment: few beers in the evening. CAFFEINE: 1-2 cups coffee daily. Review of Systems:    Constitutional: Negative for diaphoresis and fatigue  Psychological:Negative for anxiety or depression  HENT: Negative for headaches, nasal congestion, sinus pain or vertigo  Eyes: Negative for visual disturbance.    Endocrine: Negative for polydipsia/polyuria  Respiratory:  shortness of breath  Cardiovascular: Negative for chest pain, dyspnea on exertion, claudication, edema, irregular heartbeat, murmur, palpitations or shortness of breath  Gastrointestinal: Negative for abdominal pain or heartburn  Genito-Urinary: Negative for urinary frequency/urgency  Musculoskeletal: Negative for muscle pain, muscular weakness, negative for pain in arm and leg or swelling in foot and leg  Neurological: Negative for dizziness, headaches, memory loss, numbness/tingling, visual changes, syncope  Dermatological: Negative for rash    Objective:  /82   Pulse 80   Ht 6' (1.829 m)   Wt 199 lb (90.3 kg)   BMI 26.99 kg/m²   Wt Readings from Last 3 Encounters: 05/09/19 199 lb (90.3 kg)   04/18/19 200 lb 3.2 oz (90.8 kg)   03/13/19 208 lb 3.2 oz (94.4 kg)     Body mass index is 26.99 kg/m². GENERAL - Alert, oriented, pleasant, in no apparent distress. EYES: No jaundice, no conjunctival pallor. SKIN: It is warm & dry. No rashes. No Echhymosis    HEENT - No clinically significant abnormalities seen. Neck - Supple. No jugular venous distention noted. No carotid bruits. Cardiovascular - Normal S1 and S2 without obvious murmur or gallop. Extremities - No cyanosis, clubbing, +1  edema. Diminished pulse alexsander  Pulmonary - No respiratory distress. No wheezes or rales. Abdomen - No masses, tenderness, or organomegaly. Musculoskeletal - No significant edema. No joint deformities. No muscle wasting. Neurologic - Cranial nerves II through XII are grossly intact. There were no gross focal neurologic abnormalities. Lab Review   Lab Results   Component Value Date    CKTOTAL 73 05/13/2013     BNP:  No results found for: BNP  PT/INR:  No results found for: INR  No results found for: LABA1C  Lab Results   Component Value Date    WBC 6.9 12/05/2018    HCT 47.4 12/05/2018    MCV 98.3 12/05/2018     12/05/2018     Lab Results   Component Value Date    CHOL 154 05/13/2013    TRIG 53 05/13/2013    HDL 73 05/13/2013    LDLDIRECT 68 05/13/2013     Lab Results   Component Value Date    ALT 26 12/05/2018    AST 31 12/05/2018     BMP:    Lab Results   Component Value Date     03/08/2019    K 4.7 03/08/2019     03/08/2019    CO2 30 03/08/2019    BUN 18 03/08/2019    CREATININE 0.8 03/08/2019     CMP:   Lab Results   Component Value Date     03/08/2019    K 4.7 03/08/2019     03/08/2019    CO2 30 03/08/2019    BUN 18 03/08/2019    PROT 7.0 12/05/2018    PROT 6.4 10/13/2011     TSH:    Lab Results   Component Value Date    TSHHS 1.230 12/05/2018           Impression:    1. Swelling of extremity    2.  Irregular heart rate       Patient Active Problem List   Diagnosis Code    Rotator cuff tendinitis M75.80    AC (acromioclavicular) arthritis M19.019    Status post rotator cuff repair Z98.890    Shortness of breath R06.02    Emphysema of lung ([facility] [address] J43.9    Former smoker Z87.891    COPD, severe ([facility] [address] J44.9    Nocturnal hypoxemia G47.34       Assessment & Plan:    - SOB; Echo and ;darshana  Are normal    - EKG checked has PVCs     - PVD . alexsander art duplex  No PVD    - LE swelling and discoloration: Check V doppler for reflux.         Daniel Irby MA  [facility]

## 2019-05-22 ENCOUNTER — HOSPITAL ENCOUNTER (OUTPATIENT)
Dept: CARDIAC REHAB | Age: 69
Setting detail: THERAPIES SERIES
Discharge: HOME | End: 2019-05-22
Payer: COMMERCIAL

## 2019-05-24 ENCOUNTER — TELEPHONE (OUTPATIENT)
Dept: CARDIOLOGY CLINIC | Age: 69
End: 2019-05-24

## 2019-05-30 ENCOUNTER — PROCEDURE VISIT (OUTPATIENT)
Dept: CARDIOLOGY CLINIC | Age: 69
End: 2019-05-30
Payer: COMMERCIAL

## 2019-05-30 DIAGNOSIS — M79.89 SWELLING OF EXTREMITY: ICD-10-CM

## 2019-05-30 PROCEDURE — 93970 EXTREMITY STUDY: CPT | Performed by: INTERNAL MEDICINE

## 2019-06-04 ENCOUNTER — TELEPHONE (OUTPATIENT)
Dept: CARDIOLOGY CLINIC | Age: 69
End: 2019-06-04

## 2019-06-10 NOTE — TELEPHONE ENCOUNTER
Will send compression stocking prescription to patient in the mail. Scheduled Venous Ablation consult appointment with Dr. Gary Robb for 90 days out. Patient voiced understanding.

## 2019-06-10 NOTE — TELEPHONE ENCOUNTER
Called patient to talk to him about his doppler results as well as compression stockings. Patient's wife states that is too much information for her to remember to tell him and requested I call back to talk to him in about a half hour.

## 2019-06-13 ENCOUNTER — HOSPITAL ENCOUNTER (OUTPATIENT)
Dept: CARDIAC REHAB | Age: 69
Setting detail: THERAPIES SERIES
Discharge: HOME | End: 2019-06-13
Payer: COMMERCIAL

## 2019-06-17 ENCOUNTER — HOSPITAL ENCOUNTER (OUTPATIENT)
Dept: CARDIAC REHAB | Age: 69
Setting detail: THERAPIES SERIES
Discharge: HOME | End: 2019-06-17
Payer: COMMERCIAL

## 2019-06-17 PROCEDURE — G0424 PULMONARY REHAB W EXER: HCPCS

## 2019-06-18 ENCOUNTER — APPOINTMENT (OUTPATIENT)
Dept: CARDIAC REHAB | Age: 69
End: 2019-06-18
Payer: COMMERCIAL

## 2019-06-20 ENCOUNTER — HOSPITAL ENCOUNTER (OUTPATIENT)
Dept: CARDIAC REHAB | Age: 69
Setting detail: THERAPIES SERIES
Discharge: HOME | End: 2019-06-20
Payer: COMMERCIAL

## 2019-06-20 PROCEDURE — G0424 PULMONARY REHAB W EXER: HCPCS

## 2019-06-24 ENCOUNTER — APPOINTMENT (OUTPATIENT)
Dept: CARDIAC REHAB | Age: 69
End: 2019-06-24
Payer: COMMERCIAL

## 2019-06-25 ENCOUNTER — APPOINTMENT (OUTPATIENT)
Dept: CARDIAC REHAB | Age: 69
End: 2019-06-25
Payer: COMMERCIAL

## 2019-06-27 ENCOUNTER — HOSPITAL ENCOUNTER (OUTPATIENT)
Dept: CARDIAC REHAB | Age: 69
Setting detail: THERAPIES SERIES
Discharge: HOME | End: 2019-06-27
Payer: COMMERCIAL

## 2019-06-27 PROCEDURE — G0424 PULMONARY REHAB W EXER: HCPCS

## 2019-07-01 ENCOUNTER — HOSPITAL ENCOUNTER (OUTPATIENT)
Dept: CARDIAC REHAB | Age: 69
Setting detail: THERAPIES SERIES
Discharge: HOME | End: 2019-07-01
Payer: COMMERCIAL

## 2019-07-01 PROCEDURE — G0424 PULMONARY REHAB W EXER: HCPCS

## 2019-07-08 ENCOUNTER — HOSPITAL ENCOUNTER (OUTPATIENT)
Dept: CARDIAC REHAB | Age: 69
Setting detail: THERAPIES SERIES
Discharge: HOME | End: 2019-07-08
Payer: COMMERCIAL

## 2019-07-08 PROCEDURE — G0424 PULMONARY REHAB W EXER: HCPCS

## 2019-07-11 ENCOUNTER — HOSPITAL ENCOUNTER (OUTPATIENT)
Dept: CARDIAC REHAB | Age: 69
Setting detail: THERAPIES SERIES
Discharge: HOME | End: 2019-07-11
Payer: COMMERCIAL

## 2019-07-11 PROCEDURE — G0424 PULMONARY REHAB W EXER: HCPCS

## 2019-07-15 ENCOUNTER — HOSPITAL ENCOUNTER (OUTPATIENT)
Dept: CARDIAC REHAB | Age: 69
Setting detail: THERAPIES SERIES
Discharge: HOME | End: 2019-07-15
Payer: COMMERCIAL

## 2019-07-15 PROCEDURE — G0424 PULMONARY REHAB W EXER: HCPCS

## 2019-07-16 ENCOUNTER — APPOINTMENT (OUTPATIENT)
Dept: CARDIAC REHAB | Age: 69
End: 2019-07-16
Payer: COMMERCIAL

## 2019-07-18 ENCOUNTER — HOSPITAL ENCOUNTER (OUTPATIENT)
Dept: CARDIAC REHAB | Age: 69
Setting detail: THERAPIES SERIES
Discharge: HOME | End: 2019-07-18
Payer: COMMERCIAL

## 2019-07-25 ENCOUNTER — HOSPITAL ENCOUNTER (OUTPATIENT)
Dept: CARDIAC REHAB | Age: 69
Setting detail: THERAPIES SERIES
Discharge: HOME | End: 2019-07-25
Payer: COMMERCIAL

## 2019-07-25 PROCEDURE — G0424 PULMONARY REHAB W EXER: HCPCS

## 2019-07-29 ENCOUNTER — HOSPITAL ENCOUNTER (OUTPATIENT)
Dept: CARDIAC REHAB | Age: 69
Setting detail: THERAPIES SERIES
Discharge: HOME | End: 2019-07-29
Payer: COMMERCIAL

## 2019-07-29 PROCEDURE — G0424 PULMONARY REHAB W EXER: HCPCS

## 2019-08-01 ENCOUNTER — HOSPITAL ENCOUNTER (OUTPATIENT)
Dept: CARDIAC REHAB | Age: 69
Setting detail: THERAPIES SERIES
Discharge: HOME | End: 2019-08-01
Payer: COMMERCIAL

## 2019-08-01 PROCEDURE — G0424 PULMONARY REHAB W EXER: HCPCS

## 2019-08-05 ENCOUNTER — HOSPITAL ENCOUNTER (OUTPATIENT)
Dept: CARDIAC REHAB | Age: 69
Setting detail: THERAPIES SERIES
Discharge: HOME | End: 2019-08-05
Payer: COMMERCIAL

## 2019-08-05 PROCEDURE — G0424 PULMONARY REHAB W EXER: HCPCS

## 2019-08-08 ENCOUNTER — HOSPITAL ENCOUNTER (OUTPATIENT)
Dept: CARDIAC REHAB | Age: 69
Setting detail: THERAPIES SERIES
Discharge: HOME | End: 2019-08-08
Payer: COMMERCIAL

## 2019-08-08 PROCEDURE — G0424 PULMONARY REHAB W EXER: HCPCS

## 2019-08-12 ENCOUNTER — HOSPITAL ENCOUNTER (OUTPATIENT)
Dept: CARDIAC REHAB | Age: 69
Setting detail: THERAPIES SERIES
Discharge: HOME | End: 2019-08-12
Payer: COMMERCIAL

## 2019-08-12 PROCEDURE — G0424 PULMONARY REHAB W EXER: HCPCS

## 2019-08-15 ENCOUNTER — HOSPITAL ENCOUNTER (OUTPATIENT)
Dept: CARDIAC REHAB | Age: 69
Setting detail: THERAPIES SERIES
Discharge: HOME | End: 2019-08-15
Payer: COMMERCIAL

## 2019-08-15 PROCEDURE — G0424 PULMONARY REHAB W EXER: HCPCS

## 2019-08-19 ENCOUNTER — HOSPITAL ENCOUNTER (OUTPATIENT)
Dept: CARDIAC REHAB | Age: 69
Setting detail: THERAPIES SERIES
Discharge: HOME | End: 2019-08-19
Payer: COMMERCIAL

## 2019-08-19 PROCEDURE — G0424 PULMONARY REHAB W EXER: HCPCS

## 2019-08-22 ENCOUNTER — HOSPITAL ENCOUNTER (OUTPATIENT)
Dept: CARDIAC REHAB | Age: 69
Setting detail: THERAPIES SERIES
Discharge: HOME | End: 2019-08-22
Payer: COMMERCIAL

## 2019-08-22 PROCEDURE — G0424 PULMONARY REHAB W EXER: HCPCS

## 2019-08-26 ENCOUNTER — HOSPITAL ENCOUNTER (OUTPATIENT)
Dept: CARDIAC REHAB | Age: 69
Setting detail: THERAPIES SERIES
Discharge: HOME | End: 2019-08-26
Payer: COMMERCIAL

## 2019-08-26 PROCEDURE — G0424 PULMONARY REHAB W EXER: HCPCS

## 2019-08-27 ENCOUNTER — OFFICE VISIT (OUTPATIENT)
Dept: PULMONOLOGY | Age: 69
End: 2019-08-27
Payer: COMMERCIAL

## 2019-08-27 VITALS
OXYGEN SATURATION: 97 % | SYSTOLIC BLOOD PRESSURE: 158 MMHG | DIASTOLIC BLOOD PRESSURE: 84 MMHG | WEIGHT: 199 LBS | HEART RATE: 78 BPM | BODY MASS INDEX: 26.99 KG/M2

## 2019-08-27 DIAGNOSIS — G47.34 NOCTURNAL HYPOXEMIA: ICD-10-CM

## 2019-08-27 DIAGNOSIS — J43.8 OTHER EMPHYSEMA (HCC): ICD-10-CM

## 2019-08-27 DIAGNOSIS — R06.02 SHORTNESS OF BREATH: ICD-10-CM

## 2019-08-27 DIAGNOSIS — J44.9 COPD, SEVERE (HCC): Primary | ICD-10-CM

## 2019-08-27 DIAGNOSIS — Z87.891 FORMER SMOKER: ICD-10-CM

## 2019-08-27 PROCEDURE — 3023F SPIROM DOC REV: CPT | Performed by: INTERNAL MEDICINE

## 2019-08-27 PROCEDURE — 3017F COLORECTAL CA SCREEN DOC REV: CPT | Performed by: INTERNAL MEDICINE

## 2019-08-27 PROCEDURE — 1036F TOBACCO NON-USER: CPT | Performed by: INTERNAL MEDICINE

## 2019-08-27 PROCEDURE — 99213 OFFICE O/P EST LOW 20 MIN: CPT | Performed by: INTERNAL MEDICINE

## 2019-08-27 PROCEDURE — 1123F ACP DISCUSS/DSCN MKR DOCD: CPT | Performed by: INTERNAL MEDICINE

## 2019-08-27 PROCEDURE — 4040F PNEUMOC VAC/ADMIN/RCVD: CPT | Performed by: INTERNAL MEDICINE

## 2019-08-27 PROCEDURE — G8419 CALC BMI OUT NRM PARAM NOF/U: HCPCS | Performed by: INTERNAL MEDICINE

## 2019-08-27 PROCEDURE — G8926 SPIRO NO PERF OR DOC: HCPCS | Performed by: INTERNAL MEDICINE

## 2019-08-27 PROCEDURE — G8427 DOCREV CUR MEDS BY ELIG CLIN: HCPCS | Performed by: INTERNAL MEDICINE

## 2019-08-27 NOTE — PROGRESS NOTES
SUBJECTIVE:  Chief Complaint: Severe COPD, pulmonary emphysema, nocturnal hypoxemia, shortness of breath, former smoker  Gaudencio Galarza has done fairly well since I last saw him and has had no recent bronchitic infections. His only complaint is condensation within his oxygen tubing which keeps him awake at nighttime and he did have his oxygen unit replaced. He continues on Symbicort twice a day and DuoNeb several times a day. He is active in pulmonary rehab and has done well in that program.  He feels as though his exercise tolerance has improved    OBJECTIVE:  BP (!) 158/84   Pulse 78   Wt 199 lb (90.3 kg)   SpO2 97%   BMI 26.99 kg/m²      Physical Exam:  Constitutional:  He appears well developed and well-nourished. Neck:  Supple, No palpable lymphadenopathy, No JVD  Cardiovascular:  S1, S2 Normal, Regular rhythm, no murmurs or gallops, No pericardial  rubs. Pulmonary: Diminished breath sounds throughout all lung areas without wheezing or rhonchi  Abdomen: Not examined  Extremities: no edema, No DVT  Neurologic:  Awake and Alert, No focal deficits    Radiology: Chest x-ray on 3/8/2019 demonstrates stable COPD changes  PFT: Office spirometry on 3/6/2019 demonstrated severe obstructive lung defect. Following bronchodilators he had no significant response        ASSESSMENT:    1. COPD, severe ([address]    2. Other emphysema ([address]    3. Nocturnal hypoxemia    4. Shortness of breath    5. Former smoker          PLAN:   I will make no change in his current bronchodilator therapy. I did encourage him to complete his pulmonary rehab program.  I will continue to follow him  Return in 4 months (on 12/27/2019) for Recheck for COPD, Recheck for Shortness of Breath, Recheck for emphysema, Nocturnal hypoxemia. This dictation was performed with a verbal recognition program and it was checked for errors. It is possible that there are still dictated errors within this office note.   Any errors should be brought immediately to my attention for correction. All efforts were made to ensure that this office note is accurate.

## 2019-08-29 ENCOUNTER — HOSPITAL ENCOUNTER (OUTPATIENT)
Dept: CARDIAC REHAB | Age: 69
Setting detail: THERAPIES SERIES
Discharge: HOME | End: 2019-08-29
Payer: COMMERCIAL

## 2019-08-29 PROCEDURE — G0424 PULMONARY REHAB W EXER: HCPCS

## 2019-09-03 ENCOUNTER — HOSPITAL ENCOUNTER (OUTPATIENT)
Dept: CARDIAC REHAB | Age: 69
Setting detail: THERAPIES SERIES
Discharge: HOME | End: 2019-09-03
Payer: COMMERCIAL

## 2019-09-03 PROCEDURE — G0424 PULMONARY REHAB W EXER: HCPCS

## 2019-09-05 ENCOUNTER — HOSPITAL ENCOUNTER (OUTPATIENT)
Dept: CARDIAC REHAB | Age: 69
Setting detail: THERAPIES SERIES
Discharge: HOME | End: 2019-09-05
Payer: COMMERCIAL

## 2019-09-05 PROCEDURE — G0424 PULMONARY REHAB W EXER: HCPCS

## 2019-09-09 ENCOUNTER — HOSPITAL ENCOUNTER (OUTPATIENT)
Dept: CARDIAC REHAB | Age: 69
Setting detail: THERAPIES SERIES
Discharge: HOME | End: 2019-09-09
Payer: COMMERCIAL

## 2019-09-09 PROCEDURE — G0424 PULMONARY REHAB W EXER: HCPCS

## 2019-09-12 ENCOUNTER — HOSPITAL ENCOUNTER (OUTPATIENT)
Dept: CARDIAC REHAB | Age: 69
Setting detail: THERAPIES SERIES
Discharge: HOME | End: 2019-09-12
Payer: COMMERCIAL

## 2019-09-12 PROCEDURE — G0424 PULMONARY REHAB W EXER: HCPCS

## 2019-09-16 ENCOUNTER — HOSPITAL ENCOUNTER (OUTPATIENT)
Dept: CARDIAC REHAB | Age: 69
Setting detail: THERAPIES SERIES
Discharge: HOME | End: 2019-09-16
Payer: COMMERCIAL

## 2019-09-16 PROCEDURE — G0424 PULMONARY REHAB W EXER: HCPCS

## 2019-09-19 ENCOUNTER — HOSPITAL ENCOUNTER (OUTPATIENT)
Dept: CARDIAC REHAB | Age: 69
Setting detail: THERAPIES SERIES
Discharge: HOME | End: 2019-09-19
Payer: COMMERCIAL

## 2019-09-19 PROCEDURE — G0424 PULMONARY REHAB W EXER: HCPCS

## 2019-09-24 ENCOUNTER — HOSPITAL ENCOUNTER (OUTPATIENT)
Dept: CARDIAC REHAB | Age: 69
Setting detail: THERAPIES SERIES
Discharge: HOME | End: 2019-09-24
Payer: COMMERCIAL

## 2019-09-24 PROCEDURE — G0424 PULMONARY REHAB W EXER: HCPCS

## 2019-09-26 ENCOUNTER — HOSPITAL ENCOUNTER (OUTPATIENT)
Dept: CARDIAC REHAB | Age: 69
Setting detail: THERAPIES SERIES
Discharge: HOME | End: 2019-09-26
Payer: COMMERCIAL

## 2019-09-26 PROCEDURE — G0424 PULMONARY REHAB W EXER: HCPCS

## 2019-09-30 ENCOUNTER — HOSPITAL ENCOUNTER (OUTPATIENT)
Dept: CARDIAC REHAB | Age: 69
Setting detail: THERAPIES SERIES
Discharge: HOME | End: 2019-09-30
Payer: COMMERCIAL

## 2019-09-30 PROCEDURE — G0424 PULMONARY REHAB W EXER: HCPCS

## 2019-10-03 ENCOUNTER — HOSPITAL ENCOUNTER (OUTPATIENT)
Dept: CARDIAC REHAB | Age: 69
Setting detail: THERAPIES SERIES
Discharge: HOME | End: 2019-10-03
Payer: COMMERCIAL

## 2019-10-03 PROCEDURE — G0424 PULMONARY REHAB W EXER: HCPCS

## 2019-10-07 ENCOUNTER — HOSPITAL ENCOUNTER (OUTPATIENT)
Dept: CARDIAC REHAB | Age: 69
Setting detail: THERAPIES SERIES
Discharge: HOME | End: 2019-10-07
Payer: COMMERCIAL

## 2019-10-07 PROCEDURE — G0424 PULMONARY REHAB W EXER: HCPCS

## 2019-10-10 ENCOUNTER — HOSPITAL ENCOUNTER (OUTPATIENT)
Dept: CARDIAC REHAB | Age: 69
Setting detail: THERAPIES SERIES
Discharge: HOME | End: 2019-10-10
Payer: COMMERCIAL

## 2019-10-10 PROCEDURE — G0424 PULMONARY REHAB W EXER: HCPCS

## 2019-10-14 ENCOUNTER — HOSPITAL ENCOUNTER (OUTPATIENT)
Dept: CARDIAC REHAB | Age: 69
Setting detail: THERAPIES SERIES
Discharge: HOME | End: 2019-10-14
Payer: COMMERCIAL

## 2019-10-14 PROCEDURE — G0424 PULMONARY REHAB W EXER: HCPCS

## 2019-10-17 ENCOUNTER — HOSPITAL ENCOUNTER (OUTPATIENT)
Dept: CARDIAC REHAB | Age: 69
Setting detail: THERAPIES SERIES
Discharge: HOME | End: 2019-10-17
Payer: COMMERCIAL

## 2019-10-17 PROCEDURE — 93798 PHYS/QHP OP CAR RHAB W/ECG: CPT

## 2019-10-17 PROCEDURE — G0424 PULMONARY REHAB W EXER: HCPCS

## 2019-10-21 ENCOUNTER — HOSPITAL ENCOUNTER (OUTPATIENT)
Dept: CARDIAC REHAB | Age: 69
Setting detail: THERAPIES SERIES
Discharge: HOME | End: 2019-10-21
Payer: COMMERCIAL

## 2019-10-21 PROCEDURE — G0424 PULMONARY REHAB W EXER: HCPCS

## 2019-10-22 ENCOUNTER — HOSPITAL ENCOUNTER (OUTPATIENT)
Dept: CARDIAC REHAB | Age: 69
Setting detail: THERAPIES SERIES
Discharge: HOME | End: 2019-10-22
Payer: COMMERCIAL

## 2019-10-22 PROCEDURE — G0424 PULMONARY REHAB W EXER: HCPCS

## 2019-10-24 ENCOUNTER — HOSPITAL ENCOUNTER (OUTPATIENT)
Dept: CARDIAC REHAB | Age: 69
Setting detail: THERAPIES SERIES
Discharge: HOME | End: 2019-10-24
Payer: COMMERCIAL

## 2019-10-24 PROCEDURE — G0424 PULMONARY REHAB W EXER: HCPCS

## 2019-10-30 ENCOUNTER — INITIAL CONSULT (OUTPATIENT)
Dept: CARDIOLOGY CLINIC | Age: 69
End: 2019-10-30
Payer: COMMERCIAL

## 2019-10-30 VITALS
SYSTOLIC BLOOD PRESSURE: 130 MMHG | HEART RATE: 64 BPM | WEIGHT: 200.6 LBS | HEIGHT: 76 IN | DIASTOLIC BLOOD PRESSURE: 82 MMHG | BODY MASS INDEX: 24.43 KG/M2

## 2019-10-30 DIAGNOSIS — I83.893 VARICOSE VEINS OF BOTH LEGS WITH EDEMA: ICD-10-CM

## 2019-10-30 PROCEDURE — G8427 DOCREV CUR MEDS BY ELIG CLIN: HCPCS | Performed by: INTERNAL MEDICINE

## 2019-10-30 PROCEDURE — 3017F COLORECTAL CA SCREEN DOC REV: CPT | Performed by: INTERNAL MEDICINE

## 2019-10-30 PROCEDURE — 1123F ACP DISCUSS/DSCN MKR DOCD: CPT | Performed by: INTERNAL MEDICINE

## 2019-10-30 PROCEDURE — 99204 OFFICE O/P NEW MOD 45 MIN: CPT | Performed by: INTERNAL MEDICINE

## 2019-10-30 PROCEDURE — 1036F TOBACCO NON-USER: CPT | Performed by: INTERNAL MEDICINE

## 2019-10-30 PROCEDURE — G8484 FLU IMMUNIZE NO ADMIN: HCPCS | Performed by: INTERNAL MEDICINE

## 2019-10-30 PROCEDURE — G8420 CALC BMI NORM PARAMETERS: HCPCS | Performed by: INTERNAL MEDICINE

## 2019-10-30 PROCEDURE — 4040F PNEUMOC VAC/ADMIN/RCVD: CPT | Performed by: INTERNAL MEDICINE

## 2019-11-13 ENCOUNTER — OFFICE VISIT (OUTPATIENT)
Dept: CARDIOLOGY CLINIC | Age: 69
End: 2019-11-13
Payer: COMMERCIAL

## 2019-11-13 VITALS
SYSTOLIC BLOOD PRESSURE: 128 MMHG | DIASTOLIC BLOOD PRESSURE: 62 MMHG | HEIGHT: 76 IN | BODY MASS INDEX: 24.48 KG/M2 | HEART RATE: 70 BPM | WEIGHT: 201 LBS

## 2019-11-13 DIAGNOSIS — I49.9 IRREGULAR HEART RATE: Primary | ICD-10-CM

## 2019-11-13 PROCEDURE — 4040F PNEUMOC VAC/ADMIN/RCVD: CPT | Performed by: INTERNAL MEDICINE

## 2019-11-13 PROCEDURE — 3017F COLORECTAL CA SCREEN DOC REV: CPT | Performed by: INTERNAL MEDICINE

## 2019-11-13 PROCEDURE — G8484 FLU IMMUNIZE NO ADMIN: HCPCS | Performed by: INTERNAL MEDICINE

## 2019-11-13 PROCEDURE — 99214 OFFICE O/P EST MOD 30 MIN: CPT | Performed by: INTERNAL MEDICINE

## 2019-11-13 PROCEDURE — 1123F ACP DISCUSS/DSCN MKR DOCD: CPT | Performed by: INTERNAL MEDICINE

## 2019-11-13 PROCEDURE — G8427 DOCREV CUR MEDS BY ELIG CLIN: HCPCS | Performed by: INTERNAL MEDICINE

## 2019-11-13 PROCEDURE — 1036F TOBACCO NON-USER: CPT | Performed by: INTERNAL MEDICINE

## 2019-11-13 PROCEDURE — G8420 CALC BMI NORM PARAMETERS: HCPCS | Performed by: INTERNAL MEDICINE

## 2019-12-06 ENCOUNTER — TELEPHONE (OUTPATIENT)
Dept: CARDIOLOGY CLINIC | Age: 69
End: 2019-12-06

## 2019-12-30 ENCOUNTER — OFFICE VISIT (OUTPATIENT)
Dept: PULMONOLOGY | Age: 69
End: 2019-12-30
Payer: COMMERCIAL

## 2019-12-30 VITALS
HEART RATE: 90 BPM | OXYGEN SATURATION: 98 % | SYSTOLIC BLOOD PRESSURE: 122 MMHG | BODY MASS INDEX: 24.36 KG/M2 | HEIGHT: 76 IN | WEIGHT: 200 LBS | DIASTOLIC BLOOD PRESSURE: 64 MMHG

## 2019-12-30 DIAGNOSIS — G47.34 NOCTURNAL HYPOXEMIA: ICD-10-CM

## 2019-12-30 DIAGNOSIS — Z87.891 FORMER SMOKER: ICD-10-CM

## 2019-12-30 DIAGNOSIS — J44.9 COPD, SEVERE (HCC): Primary | ICD-10-CM

## 2019-12-30 DIAGNOSIS — J43.9 PULMONARY EMPHYSEMA, UNSPECIFIED EMPHYSEMA TYPE (HCC): ICD-10-CM

## 2019-12-30 DIAGNOSIS — R06.02 SHORTNESS OF BREATH: ICD-10-CM

## 2019-12-30 PROCEDURE — 99213 OFFICE O/P EST LOW 20 MIN: CPT | Performed by: INTERNAL MEDICINE

## 2019-12-30 PROCEDURE — G8427 DOCREV CUR MEDS BY ELIG CLIN: HCPCS | Performed by: INTERNAL MEDICINE

## 2019-12-30 PROCEDURE — 1036F TOBACCO NON-USER: CPT | Performed by: INTERNAL MEDICINE

## 2019-12-30 PROCEDURE — G8420 CALC BMI NORM PARAMETERS: HCPCS | Performed by: INTERNAL MEDICINE

## 2019-12-30 PROCEDURE — G8926 SPIRO NO PERF OR DOC: HCPCS | Performed by: INTERNAL MEDICINE

## 2019-12-30 PROCEDURE — 3023F SPIROM DOC REV: CPT | Performed by: INTERNAL MEDICINE

## 2019-12-30 PROCEDURE — 4040F PNEUMOC VAC/ADMIN/RCVD: CPT | Performed by: INTERNAL MEDICINE

## 2019-12-30 PROCEDURE — 1123F ACP DISCUSS/DSCN MKR DOCD: CPT | Performed by: INTERNAL MEDICINE

## 2019-12-30 PROCEDURE — 3017F COLORECTAL CA SCREEN DOC REV: CPT | Performed by: INTERNAL MEDICINE

## 2019-12-30 PROCEDURE — G8484 FLU IMMUNIZE NO ADMIN: HCPCS | Performed by: INTERNAL MEDICINE

## 2019-12-30 RX ORDER — ALBUTEROL SULFATE 90 UG/1
2 AEROSOL, METERED RESPIRATORY (INHALATION) EVERY 6 HOURS PRN
Qty: 1 INHALER | Refills: 11 | Status: SHIPPED | OUTPATIENT
Start: 2019-12-30 | End: 2021-03-15 | Stop reason: SDUPTHER

## 2019-12-30 RX ORDER — IPRATROPIUM BROMIDE AND ALBUTEROL SULFATE 2.5; .5 MG/3ML; MG/3ML
1 SOLUTION RESPIRATORY (INHALATION) EVERY 6 HOURS PRN
Qty: 120 VIAL | Refills: 11 | Status: SHIPPED | OUTPATIENT
Start: 2019-12-30 | End: 2021-01-04

## 2020-02-12 ENCOUNTER — TELEPHONE (OUTPATIENT)
Dept: CARDIOLOGY CLINIC | Age: 70
End: 2020-02-12

## 2020-02-12 NOTE — TELEPHONE ENCOUNTER
Called patient to schedule venous ablation procedure. Will call valium in to [facility].   Reviewed instructions and answered questions. Patient voiced understanding. Pre- Instructions Venous ablation     Date of Procedure: 2/17/20 Time: 11 AM Arrival Time: 1045 AM    · Please arrive at the office at 1045 AM  · Please keep yourself hydrated for 24 hours before the procedure ( drink lots of water)  · Please  the Valum that was sent to [facility] and bring it with you to the procedure. · Please wear loose comfortable clothing. · You will need someone with you to drive you home. · Please eat a light breakfast in the morning  · Please continue taking medications as directed. · If you do no not verbally confirm appointment the day/2 days before, your spot will not be held and another patient may be scheduled in your spot.

## 2020-02-14 ENCOUNTER — TELEPHONE (OUTPATIENT)
Dept: CARDIOLOGY CLINIC | Age: 70
End: 2020-02-14

## 2020-02-14 RX ORDER — DIAZEPAM 5 MG/1
5 TABLET ORAL PRN
Qty: 2 TABLET | Refills: 0 | COMMUNITY
Start: 2020-02-14 | End: 2020-02-24

## 2020-02-14 NOTE — TELEPHONE ENCOUNTER
Spoke with patient to confirm he will be here Monday for his Venous Ablation. Called in valium to [facility]. Advised patient that if for any reason he does not show up or reschedules procedure, he will need to hold on to medication until then as we will not call in another prescription. Reviewed instructions and patient voiced understanding.

## 2020-02-17 ENCOUNTER — PROCEDURE VISIT (OUTPATIENT)
Dept: CARDIOLOGY CLINIC | Age: 70
End: 2020-02-17
Payer: COMMERCIAL

## 2020-02-17 PROCEDURE — 36475 ENDOVENOUS RF 1ST VEIN: CPT | Performed by: INTERNAL MEDICINE

## 2020-02-17 NOTE — PROGRESS NOTES
Endovenous Radiofrequency Ablation Operative Report    2/17/2020    Subjective:  Pauline Quevedo is a 72 y.o. male    Procedure Performed:    Endovenous Radiofrequency Ablation [facility]) of the Great saphenous vein on the  Right side. Indication:  Varicose veins of the Bilateral LE with inflammation    The patient was transferred to the procedure suite and the insufficient saphenous vein(s) to be treated was documented. The varicose tributary veins and suitable access sites were identified and mapped as well. The patient was then positioned supine on the procedure table. The affected limb was prepped and draped in the usual sterile fashion. The RF catheter was placed on the sterile field,flushed and wiped down, prepared, and connected by a sterile cable. The patient was placed in reverse - Trendelenburg position and local anesthesia was instilled in the skin overlying the access site. A skin incision was made overlying the identified and mapped great saphenous vein entry site. The vein was accessed using ultrasound guidance and the Seldinger technique, a guide wire was introduced through the needle, which was then exchanged over the guide wire for a 7F sheath, which was secured in place. The guide wire was removed and the sheath was flushed. The RF catheter was placed into the vein through the sheath and Preferentially, imaging was used to place the catheter tip just inferior to the superficial epigastric vein to preserve normal physiological flow in that vein. Additionally, it was confirmed by ultrasound guidance that the catheter tip was also placed a minimum of 2cm distal to the saphenofemoral junction.           After the RF catheter position was verified by ultrasound, tumescent anesthesia was infiltrated, under ultrasound guidance, precisely into the perivenous compartment along the entire length of vein from the entry site to the saphenofemoral junction until a \"halo\" of fluid was noted around the vein.    The patient was then placed in Trendelenburg position to further exsanguinate the superficial venous system. After RF catheter position was again confirmed with ultrasound imaging, and under direct external compression along the length of the heating element, RF energy was applied. The vein was segmentally ablated by heating a 7 cm sefment and then indexing the catheter forward by 6.5 cm until the treatment length is completed. Device temperature was maintained at 120+5 °C with an initial power level of 40W dropping to below 20W for each treatment. Total vein length treated 46 cm Total cycles of RF 8. Repeat ultrasound of the saphenous vein was performed, confirming successful treatment. The catheter and sheath were withdrawn and hemostasis established with direct pressure. After assuring hemostasis, the skin incision over the saphenous vein was closed with a bandage and a compression wrap, and/or graduated compression stocking was applied from the level of the foot to the most proximal level of the thigh. Complications: none immediate    Blood Loss: minimal    Conclusion:   Successful Endovenous Radiofrequency Ablation (EVRFA) of the Right Great saphenous vein.     Cara Bhagat MD, [facility] [place]

## 2020-02-20 ENCOUNTER — PROCEDURE VISIT (OUTPATIENT)
Dept: CARDIOLOGY CLINIC | Age: 70
End: 2020-02-20
Payer: COMMERCIAL

## 2020-02-20 PROCEDURE — 93971 EXTREMITY STUDY: CPT | Performed by: INTERNAL MEDICINE

## 2020-03-30 ENCOUNTER — OFFICE VISIT (OUTPATIENT)
Dept: PULMONOLOGY | Age: 70
End: 2020-03-30
Payer: COMMERCIAL

## 2020-03-30 VITALS
HEART RATE: 81 BPM | SYSTOLIC BLOOD PRESSURE: 144 MMHG | DIASTOLIC BLOOD PRESSURE: 64 MMHG | TEMPERATURE: 98.2 F | BODY MASS INDEX: 24.36 KG/M2 | HEIGHT: 76 IN | WEIGHT: 200 LBS | OXYGEN SATURATION: 96 %

## 2020-03-30 PROCEDURE — 3017F COLORECTAL CA SCREEN DOC REV: CPT | Performed by: INTERNAL MEDICINE

## 2020-03-30 PROCEDURE — 99213 OFFICE O/P EST LOW 20 MIN: CPT | Performed by: INTERNAL MEDICINE

## 2020-03-30 PROCEDURE — G8427 DOCREV CUR MEDS BY ELIG CLIN: HCPCS | Performed by: INTERNAL MEDICINE

## 2020-03-30 PROCEDURE — G8926 SPIRO NO PERF OR DOC: HCPCS | Performed by: INTERNAL MEDICINE

## 2020-03-30 PROCEDURE — 3023F SPIROM DOC REV: CPT | Performed by: INTERNAL MEDICINE

## 2020-03-30 PROCEDURE — G8484 FLU IMMUNIZE NO ADMIN: HCPCS | Performed by: INTERNAL MEDICINE

## 2020-03-30 PROCEDURE — 1036F TOBACCO NON-USER: CPT | Performed by: INTERNAL MEDICINE

## 2020-03-30 PROCEDURE — 1123F ACP DISCUSS/DSCN MKR DOCD: CPT | Performed by: INTERNAL MEDICINE

## 2020-03-30 PROCEDURE — 4040F PNEUMOC VAC/ADMIN/RCVD: CPT | Performed by: INTERNAL MEDICINE

## 2020-03-30 PROCEDURE — G8420 CALC BMI NORM PARAMETERS: HCPCS | Performed by: INTERNAL MEDICINE

## 2020-03-30 RX ORDER — BUDESONIDE AND FORMOTEROL FUMARATE DIHYDRATE 160; 4.5 UG/1; UG/1
2 AEROSOL RESPIRATORY (INHALATION) EVERY 12 HOURS
Qty: 3 INHALER | Refills: 3 | Status: SHIPPED | OUTPATIENT
Start: 2020-03-30 | End: 2020-05-11

## 2020-03-30 NOTE — PROGRESS NOTES
(around 7/30/2020) for Recheck for COPD. This dictation was performed with a verbal recognition program and it was checked for errors. It is possible that there are still dictated errors within this office note. Any errors should be brought immediately to my attention for correction. All efforts were made to ensure that this office note is accurate.

## 2020-04-07 ENCOUNTER — TELEPHONE (OUTPATIENT)
Dept: CARDIOLOGY CLINIC | Age: 70
End: 2020-04-07

## 2020-04-07 ENCOUNTER — OFFICE VISIT (OUTPATIENT)
Dept: CARDIOLOGY CLINIC | Age: 70
End: 2020-04-07
Payer: COMMERCIAL

## 2020-04-07 VITALS
WEIGHT: 191.8 LBS | BODY MASS INDEX: 23.36 KG/M2 | SYSTOLIC BLOOD PRESSURE: 132 MMHG | HEART RATE: 76 BPM | DIASTOLIC BLOOD PRESSURE: 70 MMHG | HEIGHT: 76 IN

## 2020-04-07 PROCEDURE — 1036F TOBACCO NON-USER: CPT | Performed by: INTERNAL MEDICINE

## 2020-04-07 PROCEDURE — G8427 DOCREV CUR MEDS BY ELIG CLIN: HCPCS | Performed by: INTERNAL MEDICINE

## 2020-04-07 PROCEDURE — 99213 OFFICE O/P EST LOW 20 MIN: CPT | Performed by: INTERNAL MEDICINE

## 2020-04-07 PROCEDURE — G8420 CALC BMI NORM PARAMETERS: HCPCS | Performed by: INTERNAL MEDICINE

## 2020-04-07 PROCEDURE — 1123F ACP DISCUSS/DSCN MKR DOCD: CPT | Performed by: INTERNAL MEDICINE

## 2020-04-07 PROCEDURE — 3017F COLORECTAL CA SCREEN DOC REV: CPT | Performed by: INTERNAL MEDICINE

## 2020-04-07 PROCEDURE — 4040F PNEUMOC VAC/ADMIN/RCVD: CPT | Performed by: INTERNAL MEDICINE

## 2020-04-07 RX ORDER — DIAZEPAM 2 MG/1
TABLET ORAL PRN
COMMUNITY
Start: 2020-02-14

## 2020-04-07 RX ORDER — MAGNESIUM OXIDE 400 MG/1
TABLET ORAL DAILY
COMMUNITY
Start: 2020-03-05

## 2020-04-07 NOTE — PROGRESS NOTES
Complex-Biotin-FA ([facility] BALANCED B-100) TABS TAKE 1 TABLET (100 MG) BY MOUTH DAILY WITH A MEAL  3    naproxen (NAPROSYN) 500 MG tablet Take 1 tablet by mouth 2 times daily 60 tablet 0    [facility] VITAMIN D3 1000 units CAPS TAKE 1 CAPSULE (1,000 UNITS) BY MOUTH DAILY  3    furosemide (LASIX) 20 MG tablet TAKE 1 TABLET (20 MG) BY MOUTH DAILY  5    magnesium oxide (MAG-OX) 400 (241.3 Mg) MG TABS tablet TAKE 1 TABLET BY MOUTH EVERY DAY  3    tamsulosin (FLOMAX) 0.4 MG capsule TAKE 1 CAPSULE (0.4 MG) BY MOUTH ONCE A DAY AT BED TIME  6    calcium-vitamin D (OSCAL-500) 500-200 MG-UNIT per tablet Take 1 tablet by mouth daily      multivitamin (THERAGRAN) per tablet   Take 1 tablet by mouth daily       Compression Stockings MISC by Does not apply route 20 - 30 mmh wear daily and take off at night  Thigh High (Patient not taking: Reported on 4/7/2020) 2 each 0    pentoxifylline (TRENTAL) 400 MG extended release tablet TAKE 1 TABLET (400 MG) BY MOUTH 2 TIMES PER DAY WITH MEALS  2    clotrimazole-betamethasone (LOTRISONE) 1-0.05 % cream APPLY TOPICALLY TO AFFECTED AREA 2 TIMES PER DAY  1    mirtazapine (REMERON) 15 MG tablet TAKE 1 TABLET (15 MG) BY MOUTH DAILY AT BEDTIME  5     No current facility-administered medications for this visit. Allergies: Tramadol  Past Medical History:   Diagnosis Date    Arthritis     Back    Asthma     [facility]     [facility]    Chronic bronchitis (HCC)     COPD (chronic obstructive pulmonary disease) (HCC)     COPD, severe (HCC) 3/6/2019    Emphysema     Emphysema of lung ([facility] 3/6/2019    Former smoker 3/6/2019    H/O Doppler lower arterial ultrasound 04/03/2019    No evidence of significant occlusive arterial disease, normal arterial flow.  H/O Doppler right lower venous ultrasound 02/20/2020    H/O echocardiogram 08/20/15    EF 60% Normal LV and systolic function.  Mild MR & TR.    H/O echocardiogram 4/27/15    Normal LV size with grossly normal function, Mild diastolic dysfunction, mild MR, pulmonary regurgitation Mild TR subacute bacterial endocarditis prophylaxis is not required.  Hx of cardiovascular stress test 2019    EF 60%, Normal    Hx of exercise stress test 2014    Normal    Hx of Venous Doppler ultrasound 2019    No DVT or SVT, Significant reflux in RGSV and LGSV    Neuropathy     Nocturnal hypoxemia 2019    Radiation 2005    with chemo for colon cancer    Shortness of breath 3/6/2019     Past Surgical History:   Procedure Laterality Date    COLECTOMY   &    [address] [facility]    (L) leg    LUNG SURGERY  Early     AV malformation (coil placed)    OTHER SURGICAL HISTORY Right 13    Rotator cuff repair      As reviewed   Family History   Problem Relation Age of Onset    Cancer Mother         cervical    Other Mother         lung problems    Cancer Father         bone marrow    Early Death Brother         GSW    Arthritis Brother      Social History     Tobacco Use    Smoking status: Former Smoker     Packs/day: 1.00     Years: 40.00     Pack years: 40.00     Start date:      Last attempt to quit: 2016     Years since quittin.2    Smokeless tobacco: Never Used   Substance Use Topics    Alcohol use: Yes     Alcohol/week: 1.0 standard drinks     Types: 1 Cans of beer per week     Comment: 3-4 beers in the evening. CAFFEINE: 1-2 cups coffee daily. Review of Systems:    Constitutional: Negative for diaphoresis and fatigue  Psychological:Negative for anxiety or depression  HENT: Negative for headaches, nasal congestion, sinus pain or vertigo  Eyes: Negative for visual disturbance.    Endocrine: Negative for polydipsia/polyuria  Respiratory: Negative for shortness of breath  Cardiovascular: Negative for chest pain, dyspnea on exertion, claudication, edema, irregular heartbeat, murmur, palpitations or shortness of breath  Gastrointestinal: Negative for abdominal pain or

## 2020-04-07 NOTE — TELEPHONE ENCOUNTER
Left msg with pt's wife to have him call us when he gets home from his visit. Pt had a billing issue from his Venous Ablation. After Doyle Martines checked with Belvia Mohs, it was discovered that there was a billing discrepancy that has now be fixed by Belvia Mohs, and pt should not owe anything.     Also, pt's f/u R leg venous ultrasound has been r/s to 8/24/2020 @ 4:00 pm.

## 2020-04-27 ENCOUNTER — TELEPHONE (OUTPATIENT)
Dept: CARDIOLOGY CLINIC | Age: 70
End: 2020-04-27

## 2020-04-27 NOTE — TELEPHONE ENCOUNTER
Back from 4/7/2020 he was getting bills and when I reviewed it, I sent it to [employer] and they responded with --fixed the adjustments, and in fact, that resulted in a refund now due to the patient. I took care of requesting a check to him as well. Account now zero balance. I let the pt know and was very appreciative.

## 2020-05-11 RX ORDER — BUDESONIDE AND FORMOTEROL FUMARATE DIHYDRATE 160; 4.5 UG/1; UG/1
AEROSOL RESPIRATORY (INHALATION)
Qty: 30.6 INHALER | Refills: 3 | Status: SHIPPED | OUTPATIENT
Start: 2020-05-11 | End: 2021-03-15 | Stop reason: SDUPTHER

## 2020-05-18 ENCOUNTER — TELEPHONE (OUTPATIENT)
Dept: PULMONOLOGY | Age: 70
End: 2020-05-18

## 2020-05-18 RX ORDER — ALBUTEROL SULFATE 90 UG/1
2 AEROSOL, METERED RESPIRATORY (INHALATION) EVERY 6 HOURS PRN
Qty: 1 INHALER | Refills: 11 | Status: SHIPPED | OUTPATIENT
Start: 2020-05-18 | End: 2021-11-15

## 2020-05-18 NOTE — TELEPHONE ENCOUNTER
Patient gf called stating that the ProAir Inhaler is on backorder and they dont know when they will get it in. He is asking for a refill on something of. He would like something sent into [employer].  Callback number is [phone]

## 2020-06-02 ENCOUNTER — NURSE ONLY (OUTPATIENT)
Dept: PULMONOLOGY | Age: 70
End: 2020-06-02
Payer: COMMERCIAL

## 2020-06-02 LAB
EXPIRATORY TIME-POST: NORMAL
EXPIRATORY TIME: NORMAL
FEF 25-75% %CHNG: NORMAL
FEF 25-75% %PRED-POST: NORMAL
FEF 25-75% %PRED-PRE: NORMAL
FEF 25-75% PRED: NORMAL
FEF 25-75%-POST: NORMAL
FEF 25-75%-PRE: NORMAL
FEV1 %PRED-POST: 37.1 %
FEV1 %PRED-PRE: 38 %
FEV1 PRED: 4.09 L
FEV1-POST: 1.52 L
FEV1-PRE: 1.55 L
FEV1/FVC %PRED-POST: 82.9 %
FEV1/FVC %PRED-PRE: 66.2 %
FEV1/FVC PRED: 73.8 %
FEV1/FVC-POST: 61.2 %
FEV1/FVC-PRE: 48.8 %
FVC %PRED-POST: 44.9 L
FVC %PRED-PRE: 57.6 %
FVC PRED: 5.52 L
FVC-POST: 2.48 L
FVC-PRE: 3.18 L
PEF %PRED-POST: NORMAL
PEF %PRED-PRE: NORMAL
PEF PRED: NORMAL
PEF%CHNG: NORMAL
PEF-POST: NORMAL
PEF-PRE: NORMAL

## 2020-06-02 PROCEDURE — 94060 EVALUATION OF WHEEZING: CPT | Performed by: INTERNAL MEDICINE

## 2020-06-02 ASSESSMENT — PULMONARY FUNCTION TESTS
FEV1/FVC_PRE: 48.8
FVC_PRE: 3.18
FVC_PERCENT_PREDICTED_PRE: 57.6
FEV1/FVC_PERCENT_PREDICTED_POST: 82.9
FEV1_POST: 1.52
FEV1/FVC_POST: 61.2
FEV1_PRE: 1.55
FVC_PERCENT_PREDICTED_POST: 44.9
FEV1_PERCENT_PREDICTED_POST: 37.1
FVC_PREDICTED: 5.52
FEV1/FVC_PERCENT_PREDICTED_PRE: 66.2
FEV1_PERCENT_PREDICTED_PRE: 38.0
FVC_POST: 2.48
FEV1/FVC_PREDICTED: 73.8
FEV1_PREDICTED: 4.09

## 2020-06-03 PROCEDURE — 94060 EVALUATION OF WHEEZING: CPT | Performed by: INTERNAL MEDICINE

## 2020-06-16 ENCOUNTER — TELEPHONE (OUTPATIENT)
Dept: CARDIOLOGY CLINIC | Age: 70
End: 2020-06-16

## 2020-06-16 NOTE — TELEPHONE ENCOUNTER
Called patient to schedule Venous Ablation. Phone rang once, was picked up and hung up x3 attempts.  Approval received

## 2020-07-09 ENCOUNTER — TELEPHONE (OUTPATIENT)
Dept: CARDIOLOGY CLINIC | Age: 70
End: 2020-07-09

## 2020-07-09 NOTE — TELEPHONE ENCOUNTER
Pre- Instructions Venous ablation     Date of Procedure: 7/10/20 Time: 4 PM      · Please keep yourself hydrated for 24 hours before the procedure ( drink lots of water)  · Please wear loose comfortable clothing. · You will need someone with you to drive you home. · Please eat a light breakfast in the morning  · Please continue to take medications as needed. Confirmed procedure with patient. Asked to come in at 330. Patient refusing to come in any earlier stating its already been changed and it staying at the time it is and hung up.

## 2020-07-10 ENCOUNTER — PROCEDURE VISIT (OUTPATIENT)
Dept: CARDIOLOGY CLINIC | Age: 70
End: 2020-07-10
Payer: COMMERCIAL

## 2020-07-10 PROCEDURE — 36482 ENDOVEN THER CHEM ADHES 1ST: CPT | Performed by: INTERNAL MEDICINE

## 2020-07-10 NOTE — PROGRESS NOTES
Venaseal kit was then introduced and positioned at the saphenofemoral junction using ultrasound guidance. The 80 cm 7 Fr introducer sheath/dilator was positioned 5cm from the saphenofemoral junction. The guidewire and dilator were removed, and the remaining sheath was flushed with sterile saline, with the syringe remaining in place prior to the next steps. The cyanoacrylate adhesive was precisely primed into the 5 F delivery catheter and this catheter/syringe combination was attached within the dispenser gun. This \"assembly\" was introduced through the 7 F sheath and positioned 5 cm caudal of the saphenofemoral junction under ultrasound guidance. The steps from the IFU were followed for dispensing amounts, locations and compression times, e.g 2 aliquots proximally with 3 minutes of compression, and 1 aliquot every 3cm distally with 30 sec of compression along the course of the vessel. Following the last injection and compression sequence, the catheter and introducer sheath were pulled out from the access site. Hemostasis was achieved with manual compression and an adhesive bandage was applied to the incision. Ultrasound confirmed complete coaptation and closure of the treated segments of the GSV, and the absence of any DVT at the saphenofemoral junction. Treatment dose was approximately 1.8 ml and the vein length treated was 47 cm. The drapes were removed and the patient cleaned and prepared for discharge. Post op ultrasound check is scheduled for   48- 72 hours and the patient was given written post-op instructions. Complications: none immediate    Blood Loss: minimal    Conclusion:   Successful Endovenous Ablation of the Great Saphenous Vein with VenaSeal Closure System of the  Left side.     Yogesh Merlos MD, Derrick Lynn MD, [facility] [place]

## 2020-07-10 NOTE — LETTER
[facility]                            Physician: Dr. Harley Overall   [address], [address]      [facility], [address]      Phone: [phone]  Fax: [phone]    Patient Name: Eddie Brooke   : 1950  MRN# [id number]     Date of Procedure: 7/10/20  Time: 4 PM       History: HTN  HLD   DM   Cancer  Hepatitis A/B/C  HIV     Ultrasound findings:         Right              Left                   Reflux   DVT  SVT                Reflux   DVT  SVT            GSV     SSV  AASV         GSV     SSV  AASV          Compression stockings- Greater than 3 months    BP: _______ /________ HR: __________      Time Out-   Time: _______: ________  correct patient identity (Name and )   correct site (correct leg and vein)    procedure to be done  Doctor preforming procedure     Procedure:    Start Time:  _______: _______   End Time:  _______: _______      Right  Left   Saphenous Vein- Great    Small   Anterior Accessory      Above Knee Below Knee  Nitro paste    1% Lidocaine ______cc   Tumescent:______cc      _______CC Jeb De La Garza USED_Vein Length: ___________cm        Complications:          YES            NO      Patient deep system- Immediately Post RFA-YES   NO    Procedure to be staged:     [facility]                            Physician: Dr. Harley Overall   [address], [address]      [facility], [address] [facility],Third [address]      Phone: [phone]  Fax: [phone]    [address] [place] [address]    Patient Name: Eddie Brooke   : 1950  MRN# [id number]    Date of Procedure: 7/10/20  Time: 4 PM   Arrival Time: 46 PM    Eddie BARKER, authorize and consent to therapeutic vein care evaluations and treatments that may be performed by Dr. Fabiana Thorne and/or such assistants and associates as selected by Dr. Harley Overall and aided by the directed physician.  These evaluations and treatments may include, but not limited to, initial consultation, history, and physical examination, lower extremity venous ultrasound study, infiltration of chemical ablations (EVLA) Radiofrequency venous ablation, Venaseal Closure System, endovenous chemical ablation (EVGA) Ultrasound guided sclerotherapy, visual sclerotherapy, phlebectomy and/or Conservative vein therapy. PATIENT ACKNOWLEDGEMENTOF UNDERSTANDING OF RISKS  Patient has been advised and understand that varicose veins are chronic, recurrent conditions there is no permanent cure but rather these conditions are controlled and improved by treatment. Heredity is the major cause of this vein defect and new veins may develop in the future depending on your genetic predisposition and aggravating factors because you had no treatment. Patient understands the consent of the venous ablation of superficial veins, perforating veins, and ultrasound guided sclerotherapy and visual sclerotherapy does not include actual removal of the veins. Only phlebectomy includes removal of the veins. Details of the procedure, including the purpose and nature of the procedure, and treatment options have been explained to the patient, as well as possible, alternative methods of treatment of the advantages and disadvantages of each. Patient has the opportunity to have all aspects of the proposed procedure described to them and fully aware of the risks and benefits. All questions that the patient has about the proposed treatment/procedure have been fully answered. Further, it has been discussed with the patient and the patient is fully aware that the visible results of treatment might not appear for several months and treated area might look worse before it looks better.  Results of these procedures have been excellent; however, it is important to remember that each patients results may vary and therefore no guarantees or assurance have been written or implied from the results of the venous ablation and/or sclerotherapy. No employee or representative of Dr. Daryn Parsons is qualified to make any promises or other representations regarding this procedure performed. Patient is aware that the practice of medical therapy is an art and is not an exact science and acknowledge that risks, consequences, and complications may be associated with the procedure to be untaken and that no guarantees have been made to me concerning the results of the procedure. The risks of the endovenous radiofrequency ablation, are rare but have been fully explained to me (the patient) and include but are not limited to: thermal injury (burn), arterial injections, allergic reactions to medications, nerve injury (paresthesia), migration of the catheter, retention of the catheter, deep vein thrombosis (blood clot), post thrombotic syndrome (permanent leg swelling skin ulceration), failure of vein closure, pulmonary embolus(lung clot), pigmentation, inflammations if the vein (phlebitis). For most people, needle punctures into the vein do not cause any serious problems, However, they could cause dizziness, bleeding, bruising, discomfort pain, and infection. Local anesthesia will be used to minimize discomfort. In rare circumstances patient could have an allergic reaction to medications or equipment used. The risk of the ultrasound guided sclerotherapy included but are not limited to Deep Vein Thrombosis (blood clot). Pulmonary Embolus (lung clot), skin ulcerations, bruising pain, transient hyper-pigmentation (skin staining), matting, blushing, phlebitis, and hematomas within the vein (lumps within the vein that may require aspiration). I am aware that the solutions being used may be compounded as the concentrations used are not commercially available.  I understand I may request the commercial brand sclerosant at an additional cost. Patient is aware and understands that in addition to the risks specifically described above, there are many other risks that accompany any treatment or surgical procedure, such as but not limited to intra and post-operative blood loss, infection, acute and/or chronic pain, cardiac arrest and even death. Patient understands that a compressions dressing is applied and aggress to follow all post- operative instructions prescribed by Dr. Ava Goldman including, but not limited to wearing prescription compression hose as directed. I understand that follow up ultrasounds are schedule to monitor my progress and that Dr. Ava Goldman cannot provide the continuity of care that is necessary for m optimal outcome if I do not follow the post- operative instructions provided to me and/or keep my post-operative appointments. Patient further understands that if oral sedation is used it will be for the purpose of relieving stress and anxiety and to diminish pain during the procedure. If the patient elects to take oral medications for the procedure the then the patient may not drive home from the procedure and must make alternate transportations arrangements. Patient may not make any make any significant personal or business decisions and/or sign documents for at least 48 hours after the procedure for which oral sedations was used. Personal Valuables    It is understood and agreed upon that the [facility] shall not be liable for the loss or damage to any money, jewelry, documents, garments, dentures, eye glasses, hearing aids, prosthetics, or other articles of personal value. [facility] is not liable for loss or damage to any other personal property. Consent to Photography/videotaping:  [facility] is permitted to take pictures of the medical or surgical progress involving the vein.  The patient consents to photography and/or videotaping during medical or surgical procedure that is used for scientific educations or medical research purposes. (Patient identity will NOT be disclosed). The patient consents to routine photo documentations related to patient care. Patients signature below acknowledges that the patient has had the opportunity to review and retain copy of this document and has been given the opportunity to ask questions. Patient has been advised by Dr. Mery Avila of the potential risks and complications, along with the potential benefits that might result from treatment. Patient has been informed that certain medical conditions exclude the patient from being treated the patient acknowledges that he/she has provided a truthful and accurate medical history to the [facility]. Patient agrees to the terms in the forgoing document and authorizes for evaluation and treatment. Patients Signature: ___________________________Date: ________ Time: ________      Patient Printed Name: ____________________________________________________      [facility]) Witness________________________ Date: ________   Time: _________      Physician/Practitioner _______________________ Date: ________   Time: _________             [facility]                            Physician: Dr. Javed Berg   [address], [address] [facility],Third [address]      Phone: [phone]  Fax: [phone]    Patient Name: Vasyl Shrestha   : 1950  MRN# [id number]          Venous Ablations Post Procedure Instructions   Activity:   ? You are encouraged to walk at least 20 minutes every 2 hours at the least throughout the day. Walking will help the legs recovery process. ? AVOID vigorous exercise and do not lift greater than 25lbs for 72 hours following the procedure. ? Avoid prolong sitting or standing for 7 days. ? Elevate your legs at least 3 times a day while sitting. ? You may drive after the compression dressing is removed in 24 hours. ? You may return to work/School within 48 hours after procedure. ? DO NOT bath in a tub, Jacuzzi, swim, or use a sauna for 72 hours following procedure. SHOWER ONLY. Discomfort:   ? You may experience bruising, soreness and a tightness sensations following the treatment. This should begin to subside within 5-7 days. Symptoms could be more after 2-3 days after the procedure. ? Take ibuprofen every 6-8 hours as needed to reduce inflammation and for pain. ? A small amount of drainage or oozing is normal for each access site. Bandages:   ? You may remove the compression dressing 24 hours following the procedure. ? After removing the dressing: Shower, pat legs dry, and APPLY COMPRESSION STOCKINGS. ? DO NOT remove the small steri-strips they will fall off within a week. When to Call the Doctor:  ? If you have questions or concerns. ? If you have bleeding at the insertion site. ? Prolonged tenderness, redness, or warmth along the incision. ? Moderate to severe pain preventing return to normal activities  ? Shortness of breath       Follow up care:   ? The key part of your treatment and safety.      ? 3 day post venous ultrasound: ________/_______/________      Patient Signature:_________________________________________________________      Staff Given Post Instructions:_________________________________________________

## 2020-07-13 ENCOUNTER — PROCEDURE VISIT (OUTPATIENT)
Dept: CARDIOLOGY CLINIC | Age: 70
End: 2020-07-13
Payer: COMMERCIAL

## 2020-07-13 PROCEDURE — 93971 EXTREMITY STUDY: CPT | Performed by: INTERNAL MEDICINE

## 2020-07-14 ENCOUNTER — TELEPHONE (OUTPATIENT)
Dept: CARDIOLOGY CLINIC | Age: 70
End: 2020-07-14

## 2020-07-27 ENCOUNTER — OFFICE VISIT (OUTPATIENT)
Dept: PULMONOLOGY | Age: 70
End: 2020-07-27
Payer: COMMERCIAL

## 2020-07-27 VITALS
DIASTOLIC BLOOD PRESSURE: 80 MMHG | HEART RATE: 93 BPM | SYSTOLIC BLOOD PRESSURE: 168 MMHG | OXYGEN SATURATION: 97 % | WEIGHT: 200 LBS | BODY MASS INDEX: 24.36 KG/M2 | HEIGHT: 76 IN

## 2020-07-27 PROCEDURE — 3017F COLORECTAL CA SCREEN DOC REV: CPT | Performed by: INTERNAL MEDICINE

## 2020-07-27 PROCEDURE — 3023F SPIROM DOC REV: CPT | Performed by: INTERNAL MEDICINE

## 2020-07-27 PROCEDURE — G8926 SPIRO NO PERF OR DOC: HCPCS | Performed by: INTERNAL MEDICINE

## 2020-07-27 PROCEDURE — 99213 OFFICE O/P EST LOW 20 MIN: CPT | Performed by: INTERNAL MEDICINE

## 2020-07-27 PROCEDURE — 4040F PNEUMOC VAC/ADMIN/RCVD: CPT | Performed by: INTERNAL MEDICINE

## 2020-07-27 PROCEDURE — 1036F TOBACCO NON-USER: CPT | Performed by: INTERNAL MEDICINE

## 2020-07-27 PROCEDURE — 1123F ACP DISCUSS/DSCN MKR DOCD: CPT | Performed by: INTERNAL MEDICINE

## 2020-07-27 PROCEDURE — G8420 CALC BMI NORM PARAMETERS: HCPCS | Performed by: INTERNAL MEDICINE

## 2020-07-27 PROCEDURE — G8427 DOCREV CUR MEDS BY ELIG CLIN: HCPCS | Performed by: INTERNAL MEDICINE

## 2020-07-27 NOTE — PROGRESS NOTES
SUBJECTIVE:  Chief Complaint: Severe COPD, pulmonary emphysema, nocturnal hypoxemia, shortness of breath, former smoker  Suzy Salgado states that he has had no recent bronchitic infections. He has been socially distancing himself but will not wear a facemask because it makes him too short of breath. He has had no known COVID-19 exposure. He denies any fever. He continues on Symbicort twice a day and DuoNeb per nebulizer 4 times a day. He wears oxygen at nighttime. He denies hemoptysis or chest pain. ROS:  Constitution:  HEENT: Negative for ear, throat pain  Cardiovascular: Negative for chest pain, syncope, edema  Pulmonary: See HPI  Musculoskeletal: Negative for DVT, myalgias, arthralgias    OBJECTIVE:  BP (!) 168/80   Pulse 93   Ht 6' 4\" (1.93 m)   Wt 200 lb (90.7 kg)   SpO2 97%   BMI 24.34 kg/m²      Physical Exam:  Constitutional:  He appears well developed and well-nourished. Mildly barrel chested  Neck:  Supple, No palpable lymphadenopathy, No JVD  Cardiovascular:  S1, S2 Normal, Regular rhythm, no murmurs or gallops, No pericardial  rubs. Pulmonary: Diminished breath sounds throughout all lung fields without wheezing or rhonchi  Abdomen: Not examined  Extremities: no edema, No DVT  Neurologic: Oriented x3, No focal deficits    Radiology: Chest x-ray on 3/8/2019 showed no acute process with stable COPD  PFT: Office spirometry on 6/2/2020 demonstrated a severe obstructive defect with a significant response to bronchodilators in his small airways          ASSESSMENT:    1. COPD, severe ([address]    2. Pulmonary emphysema, unspecified emphysema type ([address]    3. Nocturnal hypoxemia    4. Shortness of breath    5. Former smoker          PLAN:   I will repeat his chest x-ray. I will make no change in his current bronchodilator therapy. I did urged him to wear a facemask when out side of his home.   I will continue to follow him    We have discussed the need to maintain yearly flu immunization, pneumococcal vaccination. We have discussed Coronavirus precaution including handwashing practice, wiping items touched in public such as gas pumps, door handles, shopping carts, etc. Self monitoring for infection - fever, chills, cough, SOB. Should they develop symptoms they should call office for further instructions. Return in about 4 months (around 11/27/2020) for Recheck for COPD, Recheck for Shortness of Breath, Recheck for emphysema. This dictation was performed with a verbal recognition program and it was checked for errors. It is possible that there are still dictated errors within this office note. Any errors should be brought immediately to my attention for correction. All efforts were made to ensure that this office note is accurate.

## 2020-08-17 ENCOUNTER — PROCEDURE VISIT (OUTPATIENT)
Dept: CARDIOLOGY CLINIC | Age: 70
End: 2020-08-17
Payer: COMMERCIAL

## 2020-08-17 ENCOUNTER — TELEPHONE (OUTPATIENT)
Dept: CARDIOLOGY CLINIC | Age: 70
End: 2020-08-17

## 2020-08-17 PROCEDURE — 93971 EXTREMITY STUDY: CPT | Performed by: INTERNAL MEDICINE

## 2020-08-17 NOTE — TELEPHONE ENCOUNTER
DOS 7/1/2020, the pt had an Ablation done and got a bill for $ 377.61. he is asking if this is correct. I emailed [employer] and ask then to review the acct and let me know if this is his balance. And I will let the patient know.

## 2020-08-21 ENCOUNTER — TELEPHONE (OUTPATIENT)
Dept: CARDIOLOGY CLINIC | Age: 70
End: 2020-08-21

## 2020-08-21 NOTE — TELEPHONE ENCOUNTER
Will call patient after noon to advise of results. Bilateral CFV is patent with good compressibility and respirophasic signal with good augmentation. The bilateral GSV is non-compressible with no evidence of flow just past the saphenofemoral junction to the knee.

## 2020-08-25 ENCOUNTER — OFFICE VISIT (OUTPATIENT)
Dept: CARDIOLOGY CLINIC | Age: 70
End: 2020-08-25
Payer: COMMERCIAL

## 2020-08-25 VITALS
HEIGHT: 76 IN | DIASTOLIC BLOOD PRESSURE: 78 MMHG | WEIGHT: 194 LBS | HEART RATE: 90 BPM | SYSTOLIC BLOOD PRESSURE: 130 MMHG | BODY MASS INDEX: 23.62 KG/M2

## 2020-08-25 PROCEDURE — 1123F ACP DISCUSS/DSCN MKR DOCD: CPT | Performed by: INTERNAL MEDICINE

## 2020-08-25 PROCEDURE — G8926 SPIRO NO PERF OR DOC: HCPCS | Performed by: INTERNAL MEDICINE

## 2020-08-25 PROCEDURE — 1036F TOBACCO NON-USER: CPT | Performed by: INTERNAL MEDICINE

## 2020-08-25 PROCEDURE — 99214 OFFICE O/P EST MOD 30 MIN: CPT | Performed by: INTERNAL MEDICINE

## 2020-08-25 PROCEDURE — G8427 DOCREV CUR MEDS BY ELIG CLIN: HCPCS | Performed by: INTERNAL MEDICINE

## 2020-08-25 PROCEDURE — 3017F COLORECTAL CA SCREEN DOC REV: CPT | Performed by: INTERNAL MEDICINE

## 2020-08-25 PROCEDURE — G8420 CALC BMI NORM PARAMETERS: HCPCS | Performed by: INTERNAL MEDICINE

## 2020-08-25 PROCEDURE — 3023F SPIROM DOC REV: CPT | Performed by: INTERNAL MEDICINE

## 2020-08-25 PROCEDURE — 4040F PNEUMOC VAC/ADMIN/RCVD: CPT | Performed by: INTERNAL MEDICINE

## 2020-08-25 NOTE — LETTER
[address] [facility]  [address] [address]  Phone: [phone]  Fax: [phone]    Valerie Rodriguez MD        August 25, 2020     Jodie Sampson MD  [address] [place] [id number]    Patient: Rosio Griffin  MR Number: [id number]  YOB: 1950  Date of Visit: 8/25/2020    Dear Dr. Leonie Boogie Win:    Thank you for the request for consultation for Lynn Pablo to me for the evaluation of CVI. Below are the relevant portions of my assessment and plan of care. If you have questions, please do not hesitate to call me. I look forward to following Tom Montero along with you.     Sincerely,        Valerie Rodriguez MD

## 2020-08-25 NOTE — PROGRESS NOTES
OFFICE PROGRESS NOTES      Yessy De Leon is a 72 y.o. male who has    CHIEF COMPLAINT AS FOLLOWS:  CHEST PAIN: Patient denies any C/O chest pains at this time. SOB: No C/O SOB at this time. LEG EDEMA: B/L Lower extremity edema is present but better than before. PALPITATIONS: Denies any C/O Palpitations   DIZZINESS: No C/O Dizziness   SYNCOPE: None   OTHER: Patient has not been wearing stockings. HPI: Patient is here for F/U on his CVI problems. He does not have any NEW complaints at this time. Dianna Terrazas has the following history recorded in care path:  Patient Active Problem List    Diagnosis Date Noted    Varicose veins of both legs with edema 10/30/2019    Nocturnal hypoxemia 04/18/2019    Shortness of breath 03/06/2019    Emphysema of lung ([facility] 03/06/2019    Former smoker 03/06/2019    COPD, severe ([facility] 03/06/2019    Status post rotator cuff repair 06/06/2013    Rotator cuff tendinitis 03/21/2013    AC (acromioclavicular) arthritis 03/21/2013     Current Outpatient Medications   Medication Sig Dispense Refill    albuterol sulfate HFA (VENTOLIN HFA) 108 (90 Base) MCG/ACT inhaler Inhale 2 puffs into the lungs every 6 hours as needed for Wheezing 1 Inhaler 11    budesonide-formoterol (SYMBICORT) 160-4.5 MCG/ACT AERO INHALE 2 PUFFS INTO THE LUNGS EVERY 12 HOURS AFTER INHALATION THEN GARGLE 30.6 Inhaler 3    diazePAM (VALIUM) 2 MG tablet as needed.       magnesium oxide (MAG-OX) 400 MG tablet daily      OXYGEN Inhale into the lungs daily      albuterol sulfate HFA (PROAIR HFA) 108 (90 Base) MCG/ACT inhaler Inhale 2 puffs into the lungs every 6 hours as needed for Wheezing 1 Inhaler 11    ipratropium-albuterol (DUONEB) 0.5-2.5 (3) MG/3ML SOLN nebulizer solution Take 3 mLs by nebulization every 6 hours as needed for Shortness of Breath 120 vial 11    pentoxifylline (TRENTAL) 400 MG extended release tablet TAKE 1 TABLET (400 MG) BY MOUTH 2 TIMES PER DAY WITH MEALS  2    potassium chloride (MICRO-K) 10 MEQ extended release capsule TAKE 1 CAPSULE (10 MEQ) BY MOUTH 2 TIMES PER DAY WITH FOOD  2    [facility] B-1 100 MG tablet TAKE 1 TABLET BY MOUTH EVERY DAY WITH A MEAL  3    tiZANidine (ZANAFLEX) 2 MG tablet Take 2 mg by mouth every 6 hours as needed      B Complex-Biotin-FA ([facility] BALANCED B-100) TABS TAKE 1 TABLET (100 MG) BY MOUTH DAILY WITH A MEAL  3    clotrimazole-betamethasone (LOTRISONE) 1-0.05 % cream APPLY TOPICALLY TO AFFECTED AREA 2 TIMES PER DAY  1    mirtazapine (REMERON) 15 MG tablet TAKE 1 TABLET (15 MG) BY MOUTH DAILY AT BEDTIME  5    naproxen (NAPROSYN) 500 MG tablet Take 1 tablet by mouth 2 times daily 60 tablet 0    [facility] VITAMIN D3 1000 units CAPS TAKE 1 CAPSULE (1,000 UNITS) BY MOUTH DAILY  3    furosemide (LASIX) 20 MG tablet TAKE 1 TABLET (20 MG) BY MOUTH DAILY  5    magnesium oxide (MAG-OX) 400 (241.3 Mg) MG TABS tablet TAKE 1 TABLET BY MOUTH EVERY DAY  3    tamsulosin (FLOMAX) 0.4 MG capsule TAKE 1 CAPSULE (0.4 MG) BY MOUTH ONCE A DAY AT BED TIME  6    calcium-vitamin D (OSCAL-500) 500-200 MG-UNIT per tablet Take 1 tablet by mouth daily      multivitamin (THERAGRAN) per tablet   Take 1 tablet by mouth daily       Compression Stockings MISC by Does not apply route 20 - 30 mmh wear daily and take off at night  Thigh High (Patient not taking: Reported on 8/25/2020) 2 each 0     No current facility-administered medications for this visit.       Allergies: Tramadol  Past Medical History:   Diagnosis Date    Arthritis     Back    Asthma     Cancer ([facility]     colon    Chronic bronchitis (HCC)     COPD (chronic obstructive pulmonary disease) (HCC)     COPD, severe ([address] 3/6/2019    Emphysema     Emphysema of lung ([facility] 3/6/2019    Former smoker 3/6/2019    H/O Doppler lower arterial ultrasound 04/03/2019    No evidence of significant occlusive arterial disease, Alcohol/week: 1.0 standard drinks     Types: 1 Cans of beer per week     Comment: 3-4 beers in the evening. CAFFEINE: 1-2 cups coffee daily. Review of Systems:    Constitutional: Negative for diaphoresis and fatigue  Psychological:Negative for anxiety or depression  HENT: Negative for headaches, nasal congestion, sinus pain or vertigo  Eyes: Negative for visual disturbance. Endocrine: Negative for polydipsia/polyuria  Respiratory: Negative for shortness of breath  Cardiovascular: Negative for chest pain, dyspnea on exertion, claudication, edema, irregular heartbeat, murmur, palpitations or shortness of breath  Gastrointestinal: Negative for abdominal pain or heartburn  Genito-Urinary: Negative for urinary frequency/urgency  Musculoskeletal: Negative for muscle pain, muscular weakness, negative for pain in arm and leg or swelling in foot and leg  Neurological: Negative for dizziness, headaches, memory loss, numbness/tingling, visual changes, syncope  Dermatological: Negative for rash    Objective:  /78   Pulse 90   Ht 6' 4\" (1.93 m)   Wt 194 lb (88 kg)   BMI 23.61 kg/m²   Wt Readings from Last 3 Encounters:   08/25/20 194 lb (88 kg)   07/27/20 200 lb (90.7 kg)   04/07/20 191 lb 12.8 oz (87 kg)     Body mass index is 23.61 kg/m². No flowsheet data found. Vitals:    08/25/20 1548   BP: 130/78   Pulse: 90   Weight: 194 lb (88 kg)   Height: 6' 4\" (1.93 m)      GENERAL - Alert, oriented, pleasant, in no apparent distress. EYES: No jaundice, no conjunctival pallor. SKIN: It is warm & dry. No rashes. No Echhymosis    HEENT - No clinically significant abnormalities seen. Neck - Supple. No jugular venous distention noted. No carotid bruits. Cardiovascular - Normal S1 and S2 without obvious murmur or gallop. Extremities - No cyanosis, clubbing, or significant edema. Pulmonary - No respiratory distress. No wheezes or rales.     Abdomen - No masses, tenderness, or Infarction:No  4. Atrial fibrillation & anticoagulation therapy No      Impression:    1. Varicose veins of both legs with edema    2. Former smoker    3. COPD, severe ([address]       Patient Active Problem List   Diagnosis Code    Rotator cuff tendinitis M75.80    AC (acromioclavicular) arthritis M19.019    Status post rotator cuff repair Z98.890    Shortness of breath R06.02    Emphysema of lung ([facility] J43.9    Former smoker Z87.891    COPD, severe ([facility] J44.9    Nocturnal hypoxemia G47.34    Varicose veins of both legs with edema I83.893       Assessment & Plan:    CAD:None known  HTN:no histor   CARDIOMYOPATHY: None known   CONGESTIVE HEART FAILURE: NO KNOWN HISTORY.      VHD: No significant VHD noted  DYSLIPIDEMIA: Patient's profile is at / near [facility],                                                             See most recent Lab values in Labs section above. OTHER RELEVANT DIAGNOSIS:as noted in patient's active problem list:  CVI; PATIENT TO CONTINUE TO WEAR COMPRESSION STOCKINGS. TESTS ORDERED: None this visit                                     All previously ordered tests reviewed. ARRHYTHMIAS: None known   MEDICATIONS: [facility]   Office f/u with  as scheduled. Primary/secondary prevention is the goal by aggressive risk modification, healthy and therapeutic life style changes for cardiovascular risk reduction. Various goals are discussed and multiple questions answered.

## 2020-08-25 NOTE — LETTER
Neha Whalen  1950  [id number]    Have you had any Chest Pain - No    Have you had any Shortness of Breath - Yes  If Yes - When on exertion    Have you had any dizziness - No      Have you had any palpitations - No      Do you have any edema - swelling in legs    If Yes - CHECK TO SEE IF THE EDEMA IS PITTING  How long have they been having edema - Months  If Yes - Have they worn compression stockings No        Do you have a surgery or procedure scheduled in the near future - No

## 2020-10-06 ENCOUNTER — TELEPHONE (OUTPATIENT)
Dept: CARDIOLOGY CLINIC | Age: 70
End: 2020-10-06

## 2020-11-16 ENCOUNTER — OFFICE VISIT (OUTPATIENT)
Dept: PULMONOLOGY | Age: 70
End: 2020-11-16
Payer: COMMERCIAL

## 2020-11-16 VITALS
OXYGEN SATURATION: 97 % | HEIGHT: 76 IN | WEIGHT: 198 LBS | SYSTOLIC BLOOD PRESSURE: 128 MMHG | DIASTOLIC BLOOD PRESSURE: 80 MMHG | HEART RATE: 92 BPM | BODY MASS INDEX: 24.11 KG/M2

## 2020-11-16 PROCEDURE — G8484 FLU IMMUNIZE NO ADMIN: HCPCS | Performed by: INTERNAL MEDICINE

## 2020-11-16 PROCEDURE — G8427 DOCREV CUR MEDS BY ELIG CLIN: HCPCS | Performed by: INTERNAL MEDICINE

## 2020-11-16 PROCEDURE — 1036F TOBACCO NON-USER: CPT | Performed by: INTERNAL MEDICINE

## 2020-11-16 PROCEDURE — 1123F ACP DISCUSS/DSCN MKR DOCD: CPT | Performed by: INTERNAL MEDICINE

## 2020-11-16 PROCEDURE — G8926 SPIRO NO PERF OR DOC: HCPCS | Performed by: INTERNAL MEDICINE

## 2020-11-16 PROCEDURE — 4040F PNEUMOC VAC/ADMIN/RCVD: CPT | Performed by: INTERNAL MEDICINE

## 2020-11-16 PROCEDURE — 99213 OFFICE O/P EST LOW 20 MIN: CPT | Performed by: INTERNAL MEDICINE

## 2020-11-16 PROCEDURE — G8420 CALC BMI NORM PARAMETERS: HCPCS | Performed by: INTERNAL MEDICINE

## 2020-11-16 PROCEDURE — 3017F COLORECTAL CA SCREEN DOC REV: CPT | Performed by: INTERNAL MEDICINE

## 2020-11-16 PROCEDURE — 3023F SPIROM DOC REV: CPT | Performed by: INTERNAL MEDICINE

## 2021-01-04 RX ORDER — IPRATROPIUM BROMIDE AND ALBUTEROL SULFATE 2.5; .5 MG/3ML; MG/3ML
1 SOLUTION RESPIRATORY (INHALATION) EVERY 6 HOURS PRN
Qty: 360 ML | Refills: 11 | Status: SHIPPED | OUTPATIENT
Start: 2021-01-04 | End: 2022-03-15

## 2021-01-14 ENCOUNTER — PROCEDURE VISIT (OUTPATIENT)
Dept: CARDIOLOGY CLINIC | Age: 71
End: 2021-01-14
Payer: COMMERCIAL

## 2021-01-14 DIAGNOSIS — I83.893 VARICOSE VEINS OF BOTH LEGS WITH EDEMA: ICD-10-CM

## 2021-01-14 PROCEDURE — 93971 EXTREMITY STUDY: CPT | Performed by: INTERNAL MEDICINE

## 2021-02-09 ENCOUNTER — TELEPHONE (OUTPATIENT)
Dept: CARDIOLOGY CLINIC | Age: 71
End: 2021-02-09

## 2021-03-15 ENCOUNTER — OFFICE VISIT (OUTPATIENT)
Dept: PULMONOLOGY | Age: 71
End: 2021-03-15
Payer: COMMERCIAL

## 2021-03-15 VITALS
WEIGHT: 195 LBS | DIASTOLIC BLOOD PRESSURE: 72 MMHG | SYSTOLIC BLOOD PRESSURE: 144 MMHG | HEART RATE: 79 BPM | HEIGHT: 76 IN | OXYGEN SATURATION: 96 % | BODY MASS INDEX: 23.75 KG/M2

## 2021-03-15 DIAGNOSIS — J43.9 PULMONARY EMPHYSEMA, UNSPECIFIED EMPHYSEMA TYPE (HCC): ICD-10-CM

## 2021-03-15 DIAGNOSIS — R06.02 SHORTNESS OF BREATH: ICD-10-CM

## 2021-03-15 DIAGNOSIS — Z87.891 FORMER SMOKER: ICD-10-CM

## 2021-03-15 DIAGNOSIS — J44.9 COPD, SEVERE (HCC): Primary | ICD-10-CM

## 2021-03-15 DIAGNOSIS — G47.34 NOCTURNAL HYPOXEMIA: ICD-10-CM

## 2021-03-15 PROCEDURE — 99213 OFFICE O/P EST LOW 20 MIN: CPT | Performed by: INTERNAL MEDICINE

## 2021-03-15 PROCEDURE — G8420 CALC BMI NORM PARAMETERS: HCPCS | Performed by: INTERNAL MEDICINE

## 2021-03-15 PROCEDURE — 3017F COLORECTAL CA SCREEN DOC REV: CPT | Performed by: INTERNAL MEDICINE

## 2021-03-15 PROCEDURE — 1123F ACP DISCUSS/DSCN MKR DOCD: CPT | Performed by: INTERNAL MEDICINE

## 2021-03-15 PROCEDURE — G8484 FLU IMMUNIZE NO ADMIN: HCPCS | Performed by: INTERNAL MEDICINE

## 2021-03-15 PROCEDURE — G8427 DOCREV CUR MEDS BY ELIG CLIN: HCPCS | Performed by: INTERNAL MEDICINE

## 2021-03-15 PROCEDURE — 1036F TOBACCO NON-USER: CPT | Performed by: INTERNAL MEDICINE

## 2021-03-15 PROCEDURE — 4040F PNEUMOC VAC/ADMIN/RCVD: CPT | Performed by: INTERNAL MEDICINE

## 2021-03-15 PROCEDURE — G8926 SPIRO NO PERF OR DOC: HCPCS | Performed by: INTERNAL MEDICINE

## 2021-03-15 PROCEDURE — 3023F SPIROM DOC REV: CPT | Performed by: INTERNAL MEDICINE

## 2021-03-15 RX ORDER — ALBUTEROL SULFATE 90 UG/1
2 AEROSOL, METERED RESPIRATORY (INHALATION) EVERY 6 HOURS PRN
Qty: 3 INHALER | Refills: 3 | Status: SHIPPED | OUTPATIENT
Start: 2021-03-15 | End: 2022-03-15 | Stop reason: SDUPTHER

## 2021-03-15 RX ORDER — BUDESONIDE AND FORMOTEROL FUMARATE DIHYDRATE 160; 4.5 UG/1; UG/1
AEROSOL RESPIRATORY (INHALATION)
Qty: 1 INHALER | Refills: 11 | Status: SHIPPED | OUTPATIENT
Start: 2021-03-15 | End: 2022-03-15

## 2021-03-15 NOTE — PROGRESS NOTES
SUBJECTIVE:  Chief Complaint: Severe COPD, shortness of breath, nocturnal hypoxemia, pulmonary emphysema, former smoker  Mayra Reddy states that he has had no recent episodes of bronchitis and denies any fever associate with cough. He has not had COVID-19 exposure that he is aware of and has not had COVID-19 infection. Unfortunately he has not received the COVID-19 vaccination yet. He continues on Symbicort twice a day and DuoNeb per nebulizer 4 times a day. He is wearing oxygen at nighttime. He states that he is always short of breath and never feels well. He denies hemoptysis or chest pain. ROS:  Constitution:  HEENT: Negative for ear, throat pain  Cardiovascular: Negative for chest pain, syncope, edema  Pulmonary: See HPI  Musculoskeletal: Negative for DVT, myalgias, arthralgias    OBJECTIVE:  BP (!) 144/72   Pulse 79   Ht 6' 4\" (1.93 m)   Wt 195 lb (88.5 kg)   SpO2 96%   BMI 23.74 kg/m²      Physical Exam:  Constitutional:  He appears well developed and well-nourished. Not short of breath at rest  Neck:  Supple, No palpable lymphadenopathy, No JVD  Cardiovascular:  S1, S2 Normal, Regular rhythm, no murmurs or gallops, No pericardial  rubs. Pulmonary: Breath sounds are diminished throughout all areas without wheezing or rhonchi  Abdomen: Not examined  Extremities: no edema, No DVT  Neurologic: Oriented x3, No focal deficits    Radiology: Chest x-ray on 3/8/2019 showed no acute process with stable COPD changes  PFT: Office spirometry on 6/2/2020 demonstrated a severe obstructive defect with a significant response to bronchodilators          ASSESSMENT:    1. COPD, severe ([address]    2. Pulmonary emphysema, unspecified emphysema type ([address]    3. Nocturnal hypoxemia    4. Shortness of breath    5. Former smoker          PLAN:   I did urge him to consider COVID-19 vaccination. I will make no change in his current bronchodilator therapy.   On his next visit I will arrange for him to have repeat pulmonary function test and a chest x-ray. I will continue to follow him  We have discussed the need to maintain yearly flu immunization, pneumococcal vaccination. We have discussed Coronavirus precaution including handwashing practice, wiping items touched in public such as gas pumps, door handles, shopping carts, etc. Self monitoring for infection - fever, chills, cough, SOB. Should they develop symptoms they should call office for further instructions. Return in about 4 months (around 7/15/2021) for Recheck for COPD, Recheck for Shortness of Breath, Nocturnal hypoxemia. This dictation was performed with a verbal recognition program and it was checked for errors. It is possible that there are still dictated errors within this office note. Any errors should be brought immediately to my attention for correction. All efforts were made to ensure that this office note is accurate.

## 2021-07-12 ENCOUNTER — OFFICE VISIT (OUTPATIENT)
Dept: PULMONOLOGY | Age: 71
End: 2021-07-12
Payer: COMMERCIAL

## 2021-07-12 VITALS
HEART RATE: 94 BPM | OXYGEN SATURATION: 96 % | DIASTOLIC BLOOD PRESSURE: 70 MMHG | HEIGHT: 76 IN | SYSTOLIC BLOOD PRESSURE: 150 MMHG | BODY MASS INDEX: 23.14 KG/M2 | WEIGHT: 190 LBS

## 2021-07-12 DIAGNOSIS — G47.34 NOCTURNAL HYPOXEMIA: ICD-10-CM

## 2021-07-12 DIAGNOSIS — Z87.891 FORMER SMOKER: ICD-10-CM

## 2021-07-12 DIAGNOSIS — J44.9 COPD, SEVERE (HCC): Primary | ICD-10-CM

## 2021-07-12 DIAGNOSIS — R06.02 SHORTNESS OF BREATH: ICD-10-CM

## 2021-07-12 DIAGNOSIS — J43.8 OTHER EMPHYSEMA (HCC): ICD-10-CM

## 2021-07-12 PROCEDURE — G8926 SPIRO NO PERF OR DOC: HCPCS | Performed by: INTERNAL MEDICINE

## 2021-07-12 PROCEDURE — 4040F PNEUMOC VAC/ADMIN/RCVD: CPT | Performed by: INTERNAL MEDICINE

## 2021-07-12 PROCEDURE — 1123F ACP DISCUSS/DSCN MKR DOCD: CPT | Performed by: INTERNAL MEDICINE

## 2021-07-12 PROCEDURE — 1036F TOBACCO NON-USER: CPT | Performed by: INTERNAL MEDICINE

## 2021-07-12 PROCEDURE — 3023F SPIROM DOC REV: CPT | Performed by: INTERNAL MEDICINE

## 2021-07-12 PROCEDURE — G8427 DOCREV CUR MEDS BY ELIG CLIN: HCPCS | Performed by: INTERNAL MEDICINE

## 2021-07-12 PROCEDURE — 3017F COLORECTAL CA SCREEN DOC REV: CPT | Performed by: INTERNAL MEDICINE

## 2021-07-12 PROCEDURE — G8420 CALC BMI NORM PARAMETERS: HCPCS | Performed by: INTERNAL MEDICINE

## 2021-07-12 PROCEDURE — 99213 OFFICE O/P EST LOW 20 MIN: CPT | Performed by: INTERNAL MEDICINE

## 2021-07-12 NOTE — PROGRESS NOTES
SUBJECTIVE:  Chief Complaint: Severe COPD, pulmonary emphysema, nocturnal hypoxemia  Clifford Pineda states that several months ago he did have an episode of bronchitis but did not contact anybody. He was expectorating purulent sputum with worsening shortness of breath but eventually improved and cleared his symptoms. He does continue on Symbicort twice a day and DuoNeb per nebulizer 4 times a day. He is in need of nebulizer supplies  He has not had COVID-19 exposure or infection but also is refusing to take the COVID-19 vaccination. He is not wearing a facemask when in public. He states that he is continuing to lose weight slowly. ROS:  Constitution:  HEENT: Negative for ear, throat pain  Cardiovascular: Negative for chest pain, syncope, edema  Pulmonary: See HPI  Musculoskeletal: Negative for DVT, myalgias, arthralgias    OBJECTIVE:  BP (!) 150/70   Pulse 94   Ht 6' 4\" (1.93 m)   Wt 190 lb (86.2 kg)   SpO2 96%   BMI 23.13 kg/m²      Physical Exam:  Constitutional:  He appears well developed and well-nourished. Not short of breath at rest  Neck:  Supple, No palpable lymphadenopathy, No JVD  Cardiovascular:  S1, S2 Normal, Regular rhythm, no murmurs or gallops, No pericardial  rubs. Pulmonary: Diminished breath sounds throughout all lung areas without wheezing or rhonchi  Abdomen: Not examined  Extremities: no edema, No DVT  Neurologic: Oriented x3, No focal deficits    Radiology: Chest x-ray last obtained on 3/8/2019 showed no acute process with stable COPD changes  PFT: Office spirometry in 6/2/2020 demonstrated a severe obstructive defect with a significant response to bronchodilators      Echocardiogram: 3/29/2019  Left ventricular systolic function is normal with an ejection fraction of   55-60%. Grade I diastolic dysfunction. No significant valvular disease noted. No evidence of pericardial effusion  ASSESSMENT:    1. COPD, severe ([address]    2. Other emphysema ([address]    3. Nocturnal hypoxemia    4. Shortness of breath    5. Former smoker          PLAN:   I did encourage him to call our office whenever he becomes short of breath with chest congestion and purulent sputum expectoration so I can treat his bronchitic exacerbations quickly. I mentioned that we can do this over the phone. He does need nebulizer tubing, supplies and equipment and he should benefit from its use for the treatment of his COPD with less shortness of breath and chest congestion. I am planning to repeat his pulmonary function test in several months. Once again I encouraged him to reconsider and obtain the COVID-19 vaccination. I will continue to follow him    We have discussed the need to maintain yearly flu immunization, pneumococcal vaccination. We have discussed Coronavirus precaution including handwashing practice, wiping items touched in public such as gas pumps, door handles, shopping carts, etc. Self monitoring for infection - fever, chills, cough, SOB. Should they develop symptoms they should call office for further instructions. Return in about 2 months (around 9/12/2021) for Recheck for COPD, Recheck for Shortness of Breath, Recheck for emphysema. This dictation was performed with a verbal recognition program and it was checked for errors. It is possible that there are still dictated errors within this office note. Any errors should be brought immediately to my attention for correction. All efforts were made to ensure that this office note is accurate.

## 2021-09-07 ENCOUNTER — PROCEDURE VISIT (OUTPATIENT)
Dept: PULMONOLOGY | Age: 71
End: 2021-09-07
Payer: COMMERCIAL

## 2021-09-07 DIAGNOSIS — J43.9 PULMONARY EMPHYSEMA, UNSPECIFIED EMPHYSEMA TYPE (HCC): ICD-10-CM

## 2021-09-07 DIAGNOSIS — J44.9 COPD, SEVERE (HCC): Primary | ICD-10-CM

## 2021-09-07 DIAGNOSIS — Z87.891 FORMER SMOKER: ICD-10-CM

## 2021-09-07 DIAGNOSIS — G47.34 NOCTURNAL HYPOXEMIA: ICD-10-CM

## 2021-09-07 DIAGNOSIS — R06.02 SHORTNESS OF BREATH: ICD-10-CM

## 2021-09-07 LAB
EXPIRATORY TIME-POST: NORMAL
EXPIRATORY TIME: NORMAL
FEF 25-75% %CHNG: NORMAL
FEF 25-75% %PRED-POST: NORMAL
FEF 25-75% %PRED-PRE: NORMAL
FEF 25-75% PRED: NORMAL
FEF 25-75%-POST: NORMAL
FEF 25-75%-PRE: NORMAL
FEV1 %PRED-POST: 27.7 %
FEV1 %PRED-PRE: 30.6 %
FEV1 PRED: 4.05 L
FEV1-POST: 1.12 L
FEV1-PRE: 1.24 L
FEV1/FVC %PRED-POST: 75.8 %
FEV1/FVC %PRED-PRE: 69.2 %
FEV1/FVC PRED: 73.6 %
FEV1/FVC-POST: 55.8 %
FEV1/FVC-PRE: 51 %
FVC %PRED-POST: 36.8 L
FVC %PRED-PRE: 44.4 %
FVC PRED: 5.48 L
FVC-POST: 2.02 L
FVC-PRE: 2.43 L
PEF %PRED-POST: NORMAL
PEF %PRED-PRE: NORMAL
PEF PRED: NORMAL
PEF%CHNG: NORMAL
PEF-POST: NORMAL
PEF-PRE: NORMAL

## 2021-09-07 PROCEDURE — 4040F PNEUMOC VAC/ADMIN/RCVD: CPT | Performed by: INTERNAL MEDICINE

## 2021-09-07 PROCEDURE — 99213 OFFICE O/P EST LOW 20 MIN: CPT | Performed by: INTERNAL MEDICINE

## 2021-09-07 PROCEDURE — 3017F COLORECTAL CA SCREEN DOC REV: CPT | Performed by: INTERNAL MEDICINE

## 2021-09-07 PROCEDURE — G8420 CALC BMI NORM PARAMETERS: HCPCS | Performed by: INTERNAL MEDICINE

## 2021-09-07 PROCEDURE — G8427 DOCREV CUR MEDS BY ELIG CLIN: HCPCS | Performed by: INTERNAL MEDICINE

## 2021-09-07 PROCEDURE — 1036F TOBACCO NON-USER: CPT | Performed by: INTERNAL MEDICINE

## 2021-09-07 PROCEDURE — 3023F SPIROM DOC REV: CPT | Performed by: INTERNAL MEDICINE

## 2021-09-07 PROCEDURE — G8926 SPIRO NO PERF OR DOC: HCPCS | Performed by: INTERNAL MEDICINE

## 2021-09-07 PROCEDURE — 1123F ACP DISCUSS/DSCN MKR DOCD: CPT | Performed by: INTERNAL MEDICINE

## 2021-09-07 ASSESSMENT — PULMONARY FUNCTION TESTS
FVC_POST: 2.02
FVC_PREDICTED: 5.48
FEV1/FVC_POST: 55.8
FEV1/FVC_PERCENT_PREDICTED_POST: 75.8
FVC_PERCENT_PREDICTED_PRE: 44.4
FVC_PERCENT_PREDICTED_POST: 36.8
FVC_PRE: 2.43
FEV1/FVC_PRE: 51.0
FEV1_POST: 1.12
FEV1_PREDICTED: 4.05
FEV1_PERCENT_PREDICTED_POST: 27.7
FEV1/FVC_PREDICTED: 73.6
FEV1/FVC_PERCENT_PREDICTED_PRE: 69.2
FEV1_PERCENT_PREDICTED_PRE: 30.6
FEV1_PRE: 1.24

## 2021-09-07 NOTE — PROGRESS NOTES
CHIEF COMPLIANT:  Johny Fernandez presents to the pulmonary clinic today for evaluation, spirometry testing, review of testing results and pulmonary medications. His major complaint today is severe COPD, pulmonary emphysema, shortness of breath, former smoker, nocturnal hypoxemia    HPI: Patricia Schroeder states that he has had no recent bronchitic infections. He continues on Symbicort twice a day and DuoNeb per nebulizer usually 3 but sometimes 4 times a day. Although he has had no COVID-19 exposure or infection he also has decided not to get the COVID-19 vaccination. He does continue to slowly note worsening shortness of breath but  denies chest pain or chest discomfort. His exercise capacity is significantly worsening. He continues wear oxygen at nighttime    Physical Exam:  Constitutional:  He appears well developed and well-nourished. Respiratory: No acute respiratory distress noted  Neurologic: Oriented x3, normal speech    OFFICE SPIROMETRY:  Johny Fernandez demonstrates an FEV1 of 1.24 L with an FVC of 2.43 liters. He demonstrates severe to borderline very severe obstructive lung defect. He shows no response to bronchodilators. Overall, his lung function has significantly decreasing over the past year. ASSESSMENT:    1. COPD, severe ([address]    2. Pulmonary emphysema, unspecified emphysema type ([address]    3. Nocturnal hypoxemia    4. Shortness of breath    5. Former smoker          PLAN:   I am not can change his current bronchodilator therapy but did recommend he check with his pharmacy to see if he could obtain either Trelegy or Breztri and if so I would then change him from DuoNeb to albuterol solution per nebulizer. I do believe this would be more effective than his current combination of medications. I also will consider repeating his home oxygen testing when he comes to see us next time.   I will continue to follow him      We have discussed the need to maintain yearly flu immunization, pneumococcal vaccination and coronavirus vaccination. We have discussed Coronavirus precaution including handwashing practice, wiping items touched in public such as gas pumps, door handles, shopping carts, etc. Self monitoring for infection - fever, chills, cough, SOB. Should they develop symptoms they should call office for further instructions. Return in about 2 months (around 11/15/2021) for Recheck for COPD, Recheck for Shortness of Breath, Recheck for emphysema. This dictation was performed with a verbal recognition program and it was checked for errors. It is possible that there are still dictated errors within this office note. Any errors should be brought immediately to my attention for correction. All efforts were made to ensure that this office note is accurate.

## 2021-09-08 DIAGNOSIS — J43.8 OTHER EMPHYSEMA (HCC): ICD-10-CM

## 2021-09-08 DIAGNOSIS — G47.34 NOCTURNAL HYPOXEMIA: ICD-10-CM

## 2021-09-08 DIAGNOSIS — R06.02 SHORTNESS OF BREATH: ICD-10-CM

## 2021-09-08 DIAGNOSIS — J44.9 COPD, SEVERE (HCC): ICD-10-CM

## 2021-11-15 ENCOUNTER — OFFICE VISIT (OUTPATIENT)
Dept: PULMONOLOGY | Age: 71
End: 2021-11-15
Payer: COMMERCIAL

## 2021-11-15 VITALS
BODY MASS INDEX: 23.14 KG/M2 | WEIGHT: 190 LBS | HEART RATE: 93 BPM | SYSTOLIC BLOOD PRESSURE: 150 MMHG | HEIGHT: 76 IN | DIASTOLIC BLOOD PRESSURE: 80 MMHG | OXYGEN SATURATION: 96 %

## 2021-11-15 DIAGNOSIS — R06.02 SHORTNESS OF BREATH: ICD-10-CM

## 2021-11-15 DIAGNOSIS — J44.9 COPD, SEVERE (HCC): Primary | ICD-10-CM

## 2021-11-15 DIAGNOSIS — G47.34 NOCTURNAL HYPOXEMIA: ICD-10-CM

## 2021-11-15 DIAGNOSIS — J43.9 PULMONARY EMPHYSEMA, UNSPECIFIED EMPHYSEMA TYPE (HCC): ICD-10-CM

## 2021-11-15 DIAGNOSIS — Z87.891 FORMER SMOKER: ICD-10-CM

## 2021-11-15 PROCEDURE — G8427 DOCREV CUR MEDS BY ELIG CLIN: HCPCS | Performed by: INTERNAL MEDICINE

## 2021-11-15 PROCEDURE — 4040F PNEUMOC VAC/ADMIN/RCVD: CPT | Performed by: INTERNAL MEDICINE

## 2021-11-15 PROCEDURE — 90694 VACC AIIV4 NO PRSRV 0.5ML IM: CPT | Performed by: INTERNAL MEDICINE

## 2021-11-15 PROCEDURE — 3017F COLORECTAL CA SCREEN DOC REV: CPT | Performed by: INTERNAL MEDICINE

## 2021-11-15 PROCEDURE — G8420 CALC BMI NORM PARAMETERS: HCPCS | Performed by: INTERNAL MEDICINE

## 2021-11-15 PROCEDURE — G8926 SPIRO NO PERF OR DOC: HCPCS | Performed by: INTERNAL MEDICINE

## 2021-11-15 PROCEDURE — 3023F SPIROM DOC REV: CPT | Performed by: INTERNAL MEDICINE

## 2021-11-15 PROCEDURE — 1036F TOBACCO NON-USER: CPT | Performed by: INTERNAL MEDICINE

## 2021-11-15 PROCEDURE — G0008 ADMIN INFLUENZA VIRUS VAC: HCPCS | Performed by: INTERNAL MEDICINE

## 2021-11-15 PROCEDURE — 1123F ACP DISCUSS/DSCN MKR DOCD: CPT | Performed by: INTERNAL MEDICINE

## 2021-11-15 PROCEDURE — 99213 OFFICE O/P EST LOW 20 MIN: CPT | Performed by: INTERNAL MEDICINE

## 2021-11-15 PROCEDURE — G8484 FLU IMMUNIZE NO ADMIN: HCPCS | Performed by: INTERNAL MEDICINE

## 2021-11-15 RX ORDER — BUDESONIDE, GLYCOPYRROLATE, AND FORMOTEROL FUMARATE 160; 9; 4.8 UG/1; UG/1; UG/1
2 AEROSOL, METERED RESPIRATORY (INHALATION) 2 TIMES DAILY
Qty: 1 EACH | Refills: 11 | Status: SHIPPED | OUTPATIENT
Start: 2021-11-15

## 2021-11-15 NOTE — PROGRESS NOTES
SUBJECTIVE:  Chief Complaint: Severe to very severe COPD, pulmonary emphysema, nocturnal hypoxemia, shortness of breath, former smoker  Fortunately Minnie Luis has had no recent bronchitic infections. He continues on Symbicort twice a day and DuoNeb per nebulizer 3-4 times a day. He remains short of breath with minimal exertion. He has decided not to get the COVID-19 vaccination and has not had influenza vaccine yet. ROS:  Constitution:  HEENT: Negative for ear, throat pain  Cardiovascular: Negative for chest pain, syncope, edema  Pulmonary: See HPI  Musculoskeletal: Negative for DVT, myalgias, arthralgias    OBJECTIVE:  BP (!) 150/80   Pulse 93   Ht 6' 4\" (1.93 m)   Wt 190 lb (86.2 kg)   SpO2 96%   BMI 23.13 kg/m²      Physical Exam:  Constitutional:  He appears well developed and well-nourished. Mild respiratory distress even at rest.  Does use accessory muscles of respiration  Neck:  Supple, No palpable lymphadenopathy, No JVD  Cardiovascular:  S1, S2 Normal, Regular rhythm, no murmurs or gallops, No pericardial  rubs. Pulmonary: Diminished breath sounds throughout all lung areas without wheezing or rhonchi  Abdomen: Not examined  Extremities: no edema, No DVT  Neurologic: Oriented x3, No focal deficits    Radiology: Chest x-ray on 3/8/2019 showed no acute process with stable COPD changes  PFT: Office spirometry on 9/7/2021 demonstrated a severe to borderline very severe obstructive defect with no significant response to bronchodilators and overall his lung function had significantly decreased over the past year          ASSESSMENT:    1. COPD, severe ([address]    2. Pulmonary emphysema, unspecified emphysema type ([address]    3. Nocturnal hypoxemia    4. Shortness of breath    5. Former smoker          PLAN:   I am going to give him a sample of Breztri 2 inhalations once a day and he will hold his Symbicort while using this sample.   If he tolerates it then I will have him check with his pharmacy to see if he can afford it. If he is able to use Breztri then I will change his aerosol solution from DuoNeb to albuterol. I will give him the influenza vaccine. I did request repeating his chest x-ray. I once again encouraged him to get the COVID-19 vaccination  I will continue to follow him    We have discussed the need to maintain yearly flu immunization, pneumococcal vaccination. We have discussed Coronavirus precaution including handwashing practice, wiping items touched in public such as gas pumps, door handles, shopping carts, etc. Self monitoring for infection - fever, chills, cough, SOB. Should they develop symptoms they should call office for further instructions. Return in about 4 months (around 3/15/2022) for Recheck for COPD, Recheck for Shortness of Breath, Recheck for emphysema. This dictation was performed with a verbal recognition program and it was checked for errors. It is possible that there are still dictated errors within this office note. Any errors should be brought immediately to my attention for correction. All efforts were made to ensure that this office note is accurate.

## 2021-11-17 ENCOUNTER — TELEPHONE (OUTPATIENT)
Dept: PULMONOLOGY | Age: 71
End: 2021-11-17

## 2021-11-17 DIAGNOSIS — Z87.891 FORMER SMOKER: ICD-10-CM

## 2021-11-17 DIAGNOSIS — J44.9 COPD, SEVERE (HCC): Primary | ICD-10-CM

## 2021-11-17 DIAGNOSIS — J43.9 PULMONARY EMPHYSEMA, UNSPECIFIED EMPHYSEMA TYPE (HCC): ICD-10-CM

## 2021-11-17 NOTE — TELEPHONE ENCOUNTER
Pt states insurance will cover [facility] and it should be ready in a couple of days. He mentioned that you had talked about switching his nebulizer solution but unsure of what the change was.   Please advise

## 2021-11-18 RX ORDER — ALBUTEROL SULFATE 2.5 MG/3ML
2.5 SOLUTION RESPIRATORY (INHALATION) EVERY 6 HOURS PRN
Qty: 120 EACH | Refills: 11 | Status: SHIPPED | OUTPATIENT
Start: 2021-11-18

## 2021-11-18 NOTE — TELEPHONE ENCOUNTER
Spoke with pt. He states pharmacy does not have Breztri in yet and for him to call back in a few days.

## 2022-03-15 ENCOUNTER — OFFICE VISIT (OUTPATIENT)
Dept: PULMONOLOGY | Age: 72
End: 2022-03-15
Payer: COMMERCIAL

## 2022-03-15 VITALS
OXYGEN SATURATION: 94 % | WEIGHT: 190 LBS | SYSTOLIC BLOOD PRESSURE: 130 MMHG | BODY MASS INDEX: 23.14 KG/M2 | HEART RATE: 76 BPM | DIASTOLIC BLOOD PRESSURE: 70 MMHG | HEIGHT: 76 IN

## 2022-03-15 DIAGNOSIS — J43.9 PULMONARY EMPHYSEMA, UNSPECIFIED EMPHYSEMA TYPE (HCC): ICD-10-CM

## 2022-03-15 DIAGNOSIS — R06.02 SHORTNESS OF BREATH: ICD-10-CM

## 2022-03-15 DIAGNOSIS — Z87.891 FORMER SMOKER: ICD-10-CM

## 2022-03-15 DIAGNOSIS — G47.34 NOCTURNAL HYPOXEMIA: ICD-10-CM

## 2022-03-15 DIAGNOSIS — J44.9 STAGE 4 VERY SEVERE COPD BY GOLD CLASSIFICATION (HCC): Primary | ICD-10-CM

## 2022-03-15 PROCEDURE — 99213 OFFICE O/P EST LOW 20 MIN: CPT | Performed by: INTERNAL MEDICINE

## 2022-03-15 PROCEDURE — 1123F ACP DISCUSS/DSCN MKR DOCD: CPT | Performed by: INTERNAL MEDICINE

## 2022-03-15 PROCEDURE — 4040F PNEUMOC VAC/ADMIN/RCVD: CPT | Performed by: INTERNAL MEDICINE

## 2022-03-15 PROCEDURE — 3017F COLORECTAL CA SCREEN DOC REV: CPT | Performed by: INTERNAL MEDICINE

## 2022-03-15 PROCEDURE — 1036F TOBACCO NON-USER: CPT | Performed by: INTERNAL MEDICINE

## 2022-03-15 PROCEDURE — G8427 DOCREV CUR MEDS BY ELIG CLIN: HCPCS | Performed by: INTERNAL MEDICINE

## 2022-03-15 PROCEDURE — 3023F SPIROM DOC REV: CPT | Performed by: INTERNAL MEDICINE

## 2022-03-15 PROCEDURE — G8484 FLU IMMUNIZE NO ADMIN: HCPCS | Performed by: INTERNAL MEDICINE

## 2022-03-15 PROCEDURE — G8420 CALC BMI NORM PARAMETERS: HCPCS | Performed by: INTERNAL MEDICINE

## 2022-03-15 RX ORDER — ALBUTEROL SULFATE 90 UG/1
2 AEROSOL, METERED RESPIRATORY (INHALATION) EVERY 6 HOURS PRN
Qty: 3 EACH | Refills: 3 | Status: SHIPPED | OUTPATIENT
Start: 2022-03-15

## 2022-03-15 NOTE — PROGRESS NOTES
SUBJECTIVE:  Chief Complaint: Very severe/stage IV COPD, pulmonary emphysema, nocturnal hypoxemia, shortness of breath, former smoker  Nicole Jerome states that he had a recent episode of bronchitis last month but did not seek medical care he did not go to the urgent care or require hospitalization. He got over it without antibiotics or oral steroids. He continues on Breztri 2 inhalations twice a day, ProAir 2 puffs as needed every 4 hours and also has albuterol solution for his nebulizer. He is not aware of having COVID-19 infection and has not received the COVID-19 vaccinations    ROS:  Constitution:  HEENT: Negative for ear, throat pain  Cardiovascular: Negative for chest pain, syncope, edema  Pulmonary: See HPI  Musculoskeletal: Negative for DVT, myalgias, arthralgias    OBJECTIVE:  /70   Pulse 76   Ht 6' 4\" (1.93 m)   Wt 190 lb (86.2 kg)   SpO2 94%   BMI 23.13 kg/m²      Physical Exam:  Constitutional:  He appears well developed and well-nourished. Always appears to be mildly short of breath even at rest  Neck:  Supple, No palpable lymphadenopathy, No JVD  Cardiovascular:  S1, S2 Normal, Regular rhythm, no murmurs or gallops, No pericardial  rubs. Pulmonary: Diminished breath sounds throughout all lung fields without wheezing or rhonchi  Abdomen: Not examined  Extremities: no edema, No DVT  Neurologic: Oriented x3, No focal deficits    Radiology: I had ordered a chest x-ray but he failed to get it and will try to get that before his next office visit. His most recent chest x-ray prior to this is on 3/8/2019 which showed no acute process with stable COPD changes  PFT: Office spirometry on 9/7/2021 demonstrated a severe to borderline very severe obstructive lung defect with no significant response to bronchodilators      Echocardiogram: Last done on 3/29/2019 showed normal left ventricular function with grade 1 diastolic dysfunction.   No mention of pulmonary hypertension    ASSESSMENT:    1. Stage 4 very severe COPD by GOLD classification ([address]    2. Shortness of breath    3. Nocturnal hypoxemia    4. Pulmonary emphysema, unspecified emphysema type ([address]    5. Former smoker          PLAN:   I think is very important for him to establish a relationship with a family physician and gave him several names to use. I did renew his ProAir rescue inhaler and asked him to get his chest x-ray. I will make no other changes in his current bronchodilator therapy. I did request that he call our office if he developed any signs or symptoms suggesting bronchitic exacerbation. Once again I advised him to get the COVID-19 vaccinations but he is declining. I will continue to follow him    We have discussed the need to maintain yearly flu immunization, pneumococcal vaccination. We have discussed Coronavirus precaution including handwashing practice, wiping items touched in public such as gas pumps, door handles, shopping carts, etc. Self monitoring for infection - fever, chills, cough, SOB. Should they develop symptoms they should call office for further instructions. Return in about 4 months (around 7/15/2022) for Recheck for COPD, Recheck for Shortness of Breath, Recheck for emphysema. This dictation was performed with a verbal recognition program and it was checked for errors. It is possible that there are still dictated errors within this office note. Any errors should be brought immediately to my attention for correction. All efforts were made to ensure that this office note is accurate.

## 2022-07-18 ENCOUNTER — OFFICE VISIT (OUTPATIENT)
Dept: PULMONOLOGY | Age: 72
End: 2022-07-18
Payer: COMMERCIAL

## 2022-07-18 VITALS
HEART RATE: 84 BPM | SYSTOLIC BLOOD PRESSURE: 140 MMHG | DIASTOLIC BLOOD PRESSURE: 70 MMHG | BODY MASS INDEX: 22.53 KG/M2 | HEIGHT: 76 IN | OXYGEN SATURATION: 95 % | WEIGHT: 185 LBS

## 2022-07-18 DIAGNOSIS — Z87.891 FORMER SMOKER: ICD-10-CM

## 2022-07-18 DIAGNOSIS — J44.9 STAGE 4 VERY SEVERE COPD BY GOLD CLASSIFICATION (HCC): Primary | ICD-10-CM

## 2022-07-18 DIAGNOSIS — G47.34 NOCTURNAL HYPOXEMIA: ICD-10-CM

## 2022-07-18 DIAGNOSIS — R06.02 SHORTNESS OF BREATH: ICD-10-CM

## 2022-07-18 DIAGNOSIS — J43.9 PULMONARY EMPHYSEMA, UNSPECIFIED EMPHYSEMA TYPE (HCC): ICD-10-CM

## 2022-07-18 PROCEDURE — G8420 CALC BMI NORM PARAMETERS: HCPCS | Performed by: INTERNAL MEDICINE

## 2022-07-18 PROCEDURE — G8427 DOCREV CUR MEDS BY ELIG CLIN: HCPCS | Performed by: INTERNAL MEDICINE

## 2022-07-18 PROCEDURE — 3017F COLORECTAL CA SCREEN DOC REV: CPT | Performed by: INTERNAL MEDICINE

## 2022-07-18 PROCEDURE — 1123F ACP DISCUSS/DSCN MKR DOCD: CPT | Performed by: INTERNAL MEDICINE

## 2022-07-18 PROCEDURE — 3023F SPIROM DOC REV: CPT | Performed by: INTERNAL MEDICINE

## 2022-07-18 PROCEDURE — 99213 OFFICE O/P EST LOW 20 MIN: CPT | Performed by: INTERNAL MEDICINE

## 2022-07-18 PROCEDURE — 1036F TOBACCO NON-USER: CPT | Performed by: INTERNAL MEDICINE

## 2022-07-18 NOTE — PROGRESS NOTES
SUBJECTIVE:  Chief Complaint: Very severe/stage IV COPD, pulmonary emphysema, shortness of breath, former smoker  Jony Garcias states that he has had no recent bronchitic infections. He is tolerating Breztri 2 inhalations twice a day well and using albuterol solution his nebulizer as needed along with his albuterol rescue inhaler. He denies chest pain or chest discomfort. He has not had COVID-19 exposure or infection and has not received any COVID-19 vaccinations against medical recommendation. He is wearing oxygen throughout the night and sometimes will put it on during the day. He has not qualified for home oxygen during the day  ROS:  Constitution:  HEENT: Negative for ear, throat pain  Cardiovascular: Negative for chest pain, syncope, edema  Pulmonary: See HPI  Musculoskeletal: Negative for DVT, myalgias, arthralgias    OBJECTIVE:  BP (!) 140/70   Pulse 84   Ht 6' 4\" (1.93 m)   Wt 185 lb (83.9 kg)   SpO2 95%   BMI 22.52 kg/m²      Physical Exam:  Constitutional:  He appears well developed and well-nourished. Neck:  Supple, No palpable lymphadenopathy, No JVD  Cardiovascular:  S1, S2 Normal, Regular rhythm, no murmurs or gallops, No pericardial  rubs. Pulmonary: Markedly diminished breath sounds throughout all lung areas without wheezing or rhonchi  Abdomen: Not examined  Extremities: no edema, No DVT  Neurologic: Oriented x3, No focal deficits    Radiology: He was to have a chest x-ray but they required facemask and he refused to do so and therefore did not get his chest x-ray done  PFT: Office spirometry done on 9/7/2021 demonstrated a severe to borderline very severe obstructive lung defect with no significant response to bronchodilators and overall his lung function had significantly decreased over the previous year          ASSESSMENT:    1. Stage 4 very severe COPD by GOLD classification ([address]    2. Nocturnal hypoxemia    3. Pulmonary emphysema, unspecified emphysema type ([address]    4.  Shortness of

## 2022-12-05 ENCOUNTER — TELEPHONE (OUTPATIENT)
Dept: PULMONOLOGY | Age: 72
End: 2022-12-05

## 2022-12-05 ENCOUNTER — OFFICE VISIT (OUTPATIENT)
Dept: PULMONOLOGY | Age: 72
End: 2022-12-05
Payer: COMMERCIAL

## 2022-12-05 VITALS
HEIGHT: 76 IN | SYSTOLIC BLOOD PRESSURE: 140 MMHG | BODY MASS INDEX: 21.8 KG/M2 | HEART RATE: 94 BPM | WEIGHT: 179 LBS | OXYGEN SATURATION: 95 % | DIASTOLIC BLOOD PRESSURE: 70 MMHG

## 2022-12-05 DIAGNOSIS — J44.9 STAGE 4 VERY SEVERE COPD BY GOLD CLASSIFICATION (HCC): Primary | ICD-10-CM

## 2022-12-05 DIAGNOSIS — G47.34 NOCTURNAL HYPOXEMIA: ICD-10-CM

## 2022-12-05 DIAGNOSIS — J43.9 PULMONARY EMPHYSEMA, UNSPECIFIED EMPHYSEMA TYPE (HCC): ICD-10-CM

## 2022-12-05 DIAGNOSIS — Z87.891 FORMER SMOKER: ICD-10-CM

## 2022-12-05 DIAGNOSIS — R06.02 SHORTNESS OF BREATH: ICD-10-CM

## 2022-12-05 PROCEDURE — 99213 OFFICE O/P EST LOW 20 MIN: CPT | Performed by: INTERNAL MEDICINE

## 2022-12-05 PROCEDURE — 1123F ACP DISCUSS/DSCN MKR DOCD: CPT | Performed by: INTERNAL MEDICINE

## 2022-12-05 RX ORDER — BUDESONIDE, GLYCOPYRROLATE, AND FORMOTEROL FUMARATE 160; 9; 4.8 UG/1; UG/1; UG/1
2 AEROSOL, METERED RESPIRATORY (INHALATION) 2 TIMES DAILY
Qty: 1 EACH | Refills: 11 | Status: SHIPPED | OUTPATIENT
Start: 2022-12-05

## 2022-12-05 RX ORDER — ALBUTEROL SULFATE 90 UG/1
2 AEROSOL, METERED RESPIRATORY (INHALATION) EVERY 6 HOURS PRN
Qty: 3 EACH | Refills: 3 | Status: SHIPPED | OUTPATIENT
Start: 2022-12-05

## 2022-12-05 RX ORDER — ALBUTEROL SULFATE 2.5 MG/3ML
2.5 SOLUTION RESPIRATORY (INHALATION) EVERY 6 HOURS PRN
Qty: 120 EACH | Refills: 11 | Status: SHIPPED | OUTPATIENT
Start: 2022-12-05

## 2022-12-05 NOTE — PROGRESS NOTES
SUBJECTIVE:  Chief Complaint: Very severe/stage IV COPD, pulmonary emphysema, shortness of breath, former smoker, nocturnal hypoxemia  Rachel Crisostomo states that he has had no recent bronchitic infections and denies worsening shortness of breath but is always short of breath even at rest.  He continues on Breztri 2 puffs twice a day and his albuterol rescue inhaler or albuterol solution for his nebulizer. He denies chest pain or chest discomfort. Continues wear oxygen at nighttime and sometimes will put it on during the day if he needs it but typically does not use it during the day. He did not receive the influenza vaccine this year and has done so yearly in the past.  He still has not received any COVID-19 vaccinations but fortunately has not had COVID-19 infection      ROS:  Constitution:  HEENT: Negative for ear, throat pain  Cardiovascular: Negative for chest pain, syncope, edema  Pulmonary: See HPI  Musculoskeletal: Negative for DVT, myalgias, arthralgias    OBJECTIVE:  BP (!) 140/70   Pulse 94   Ht 6' 4\" (1.93 m)   Wt 179 lb (81.2 kg)   SpO2 95%   BMI 21.79 kg/m²      Physical Exam:  Constitutional:  He appears well developed and well-nourished. Always appears to be mildly short of breath even at rest.  Not using accessory muscles or respiration  Neck:  Supple, No palpable lymphadenopathy, No JVD  Cardiovascular:  S1, S2 Normal, Regular rhythm, no murmurs or gallops, No pericardial  rubs. Pulmonary: Diminished breath sounds throughout all lung areas without wheezing or rhonchi  Abdomen: Not examined  Extremities: no edema, No DVT  Neurologic: Oriented x3, No focal deficits    Radiology: Chest x-ray last obtained on 3/8/2019 showing no acute changes.   Stable COPD changes  PFT: Office spirometry last obtained on 9/7/2021 demonstrated a severe to borderline very severe obstructive lung defect with no significant response of bronchodilators      Echocardiogram: 3/29/2019  Left ventricular systolic function is normal with an ejection fraction of   55-60%. Grade I diastolic dysfunction. No significant valvular disease noted. No evidence of pericardial effusion  Estimated RVSP 29 mmHg  ASSESSMENT:    1. Stage 4 very severe COPD by GOLD classification ([address]    2. Shortness of breath    3. Pulmonary emphysema, unspecified emphysema type ([address]    4. Nocturnal hypoxemia    5. Former smoker          PLAN:  I did renew all his inhaled bronchodilators and will make no change in his current therapy. Once again I urged him to consider COVID-19 vaccination and to obtain the influenza vaccine. He has benefited from the use of oxygen at nighttime and I told him to continue using it as he has better daytime energy. [facility] will continue to follow him    We have discussed the need to maintain yearly flu immunization, pneumococcal vaccination. We have discussed Coronavirus precaution including handwashing practice, wiping items touched in public such as gas pumps, door handles, shopping carts, etc. Self monitoring for infection - fever, chills, cough, SOB. Should they develop symptoms they should call office for further instructions. Return in about 4 months (around 4/20/2023) for Recheck for COPD, Recheck for Shortness of Breath, Recheck for emphysema. This dictation was performed with a verbal recognition program and it was checked for errors. It is possible that there are still dictated errors within this office note. Any errors should be brought immediately to my attention for correction. All efforts were made to ensure that this office note is accurate.

## 2022-12-05 NOTE — TELEPHONE ENCOUNTER
Pt came into office and wanted it on record that he doesn't want reminder calls. He informed us he receives too many nonsense calls as is.

## 2023-01-18 RX ORDER — ALBUTEROL SULFATE 2.5 MG/3ML
2.5 SOLUTION RESPIRATORY (INHALATION) EVERY 6 HOURS PRN
Qty: 125 EACH | Refills: 11 | Status: SHIPPED | OUTPATIENT
Start: 2023-01-18

## 2023-04-25 ENCOUNTER — OFFICE VISIT (OUTPATIENT)
Dept: PULMONOLOGY | Age: 73
End: 2023-04-25
Payer: COMMERCIAL

## 2023-04-25 VITALS
HEART RATE: 101 BPM | RESPIRATION RATE: 16 BRPM | WEIGHT: 179 LBS | OXYGEN SATURATION: 94 % | HEIGHT: 76 IN | BODY MASS INDEX: 21.8 KG/M2

## 2023-04-25 DIAGNOSIS — R06.02 SHORTNESS OF BREATH: ICD-10-CM

## 2023-04-25 DIAGNOSIS — J44.9 STAGE 4 VERY SEVERE COPD BY GOLD CLASSIFICATION (HCC): Primary | ICD-10-CM

## 2023-04-25 DIAGNOSIS — J43.9 PULMONARY EMPHYSEMA, UNSPECIFIED EMPHYSEMA TYPE (HCC): ICD-10-CM

## 2023-04-25 DIAGNOSIS — Z87.891 FORMER SMOKER: ICD-10-CM

## 2023-04-25 DIAGNOSIS — G47.34 NOCTURNAL HYPOXEMIA: ICD-10-CM

## 2023-04-25 PROCEDURE — 3023F SPIROM DOC REV: CPT | Performed by: INTERNAL MEDICINE

## 2023-04-25 PROCEDURE — G8420 CALC BMI NORM PARAMETERS: HCPCS | Performed by: INTERNAL MEDICINE

## 2023-04-25 PROCEDURE — 99213 OFFICE O/P EST LOW 20 MIN: CPT | Performed by: INTERNAL MEDICINE

## 2023-04-25 PROCEDURE — 1123F ACP DISCUSS/DSCN MKR DOCD: CPT | Performed by: INTERNAL MEDICINE

## 2023-04-25 PROCEDURE — 1036F TOBACCO NON-USER: CPT | Performed by: INTERNAL MEDICINE

## 2023-04-25 PROCEDURE — 3017F COLORECTAL CA SCREEN DOC REV: CPT | Performed by: INTERNAL MEDICINE

## 2023-04-25 PROCEDURE — G8427 DOCREV CUR MEDS BY ELIG CLIN: HCPCS | Performed by: INTERNAL MEDICINE

## 2023-04-25 NOTE — PROGRESS NOTES
SUBJECTIVE:  Chief Complaint: Severe/stage IV COPD, pulmonary emphysema, shortness of breath, nocturnal hypoxemia, former smoker  Kendrick Shaikh states that he has had no recent bronchitic infections and denies worsening shortness of breath or chest discomfort. He continues on Breztri 2 puffs twice a day and his albuterol inhaler or albuterol solution per nebulizer as needed and states that he tends to use his nebulizer 3-4 times a day. He does wear oxygen throughout the night and will put it on during the day when he gets more short of breath. His major complaint is very severe low back pain and sciatica and is seeing a chiropractor presently without much improvement. ROS:  Constitution:  HEENT: Negative for ear, throat pain  Cardiovascular: Negative for chest pain, syncope, edema  Pulmonary: See HPI  Musculoskeletal: Negative for DVT, myalgias, arthralgias    OBJECTIVE:  Pulse (!) 101   Resp 16   Ht 6' 4\" (1.93 m)   Wt 179 lb (81.2 kg)   SpO2 94%   BMI 21.79 kg/m²      Physical Exam:  Constitutional:  He appears well developed and well-nourished. Neck:  Supple, No palpable lymphadenopathy, No JVD  Cardiovascular:  S1, S2 Normal, Regular rhythm, no murmurs or gallops, No pericardial  rubs. Pulmonary: Breath sounds are diminished throughout all lung areas without wheezing or rhonchi  Abdomen: Not examined  Extremities: no edema, No DVT  Neurologic: Oriented x3, No focal deficits    Radiology: Chest x-ray last obtained on 3/8/2019 showed no acute changes with stable COPD  PFT: Office spirometry on 9/7/2021 demonstrated a severe to borderline very severe obstructive lung defect with no significant response to bronchodilators          ASSESSMENT:    1. Stage 4 very severe COPD by GOLD classification ([address]    2. Shortness of breath    3. Nocturnal hypoxemia    4. Pulmonary emphysema, unspecified emphysema type ([address]    5. Former smoker          PLAN:  I will make no change in his current bronchodilator therapy.   I

## 2023-07-12 RX ORDER — ALBUTEROL SULFATE 2.5 MG/3ML
SOLUTION RESPIRATORY (INHALATION)
Qty: 450 ML | Refills: 3 | Status: SHIPPED | OUTPATIENT
Start: 2023-07-12

## 2023-10-05 ENCOUNTER — OFFICE VISIT (OUTPATIENT)
Dept: PULMONOLOGY | Age: 73
End: 2023-10-05
Payer: COMMERCIAL

## 2023-10-05 VITALS
WEIGHT: 179 LBS | DIASTOLIC BLOOD PRESSURE: 74 MMHG | SYSTOLIC BLOOD PRESSURE: 140 MMHG | HEIGHT: 76 IN | OXYGEN SATURATION: 96 % | HEART RATE: 73 BPM | RESPIRATION RATE: 16 BRPM | BODY MASS INDEX: 21.8 KG/M2

## 2023-10-05 DIAGNOSIS — Z87.891 FORMER SMOKER: ICD-10-CM

## 2023-10-05 DIAGNOSIS — G47.34 NOCTURNAL HYPOXEMIA: ICD-10-CM

## 2023-10-05 DIAGNOSIS — R63.4 WEIGHT LOSS: ICD-10-CM

## 2023-10-05 DIAGNOSIS — R06.02 SHORTNESS OF BREATH: ICD-10-CM

## 2023-10-05 DIAGNOSIS — J43.9 PULMONARY EMPHYSEMA, UNSPECIFIED EMPHYSEMA TYPE (HCC): ICD-10-CM

## 2023-10-05 DIAGNOSIS — J44.9 STAGE 4 VERY SEVERE COPD BY GOLD CLASSIFICATION (HCC): Primary | ICD-10-CM

## 2023-10-05 PROCEDURE — G8420 CALC BMI NORM PARAMETERS: HCPCS | Performed by: INTERNAL MEDICINE

## 2023-10-05 PROCEDURE — 1123F ACP DISCUSS/DSCN MKR DOCD: CPT | Performed by: INTERNAL MEDICINE

## 2023-10-05 PROCEDURE — 99213 OFFICE O/P EST LOW 20 MIN: CPT | Performed by: INTERNAL MEDICINE

## 2023-10-05 PROCEDURE — 3023F SPIROM DOC REV: CPT | Performed by: INTERNAL MEDICINE

## 2023-10-05 PROCEDURE — G8484 FLU IMMUNIZE NO ADMIN: HCPCS | Performed by: INTERNAL MEDICINE

## 2023-10-05 PROCEDURE — G8427 DOCREV CUR MEDS BY ELIG CLIN: HCPCS | Performed by: INTERNAL MEDICINE

## 2023-10-05 PROCEDURE — 1036F TOBACCO NON-USER: CPT | Performed by: INTERNAL MEDICINE

## 2023-10-05 PROCEDURE — 3017F COLORECTAL CA SCREEN DOC REV: CPT | Performed by: INTERNAL MEDICINE

## 2023-12-12 RX ORDER — BUDESONIDE, GLYCOPYRROLATE, AND FORMOTEROL FUMARATE 160; 9; 4.8 UG/1; UG/1; UG/1
2 AEROSOL, METERED RESPIRATORY (INHALATION) 2 TIMES DAILY
Qty: 10.7 EACH | Refills: 11 | Status: SHIPPED | OUTPATIENT
Start: 2023-12-12

## 2023-12-22 RX ORDER — ALBUTEROL SULFATE 90 UG/1
AEROSOL, METERED RESPIRATORY (INHALATION)
Qty: 20.1 EACH | Refills: 0 | Status: SHIPPED | OUTPATIENT
Start: 2023-12-22 | End: 2024-02-15 | Stop reason: SDUPTHER

## 2024-02-12 RX ORDER — ALBUTEROL SULFATE 2.5 MG/3ML
SOLUTION RESPIRATORY (INHALATION)
Qty: 120 ML | Refills: 11 | Status: SHIPPED | OUTPATIENT
Start: 2024-02-12 | End: 2024-02-15 | Stop reason: SDUPTHER

## 2024-02-15 DIAGNOSIS — J44.9 CHRONIC OBSTRUCTIVE PULMONARY DISEASE, UNSPECIFIED COPD TYPE (HCC): Primary | ICD-10-CM

## 2024-02-16 RX ORDER — ALBUTEROL SULFATE 2.5 MG/3ML
SOLUTION RESPIRATORY (INHALATION)
Qty: 360 ML | Refills: 11 | Status: SHIPPED | OUTPATIENT
Start: 2024-02-16

## 2024-02-16 RX ORDER — ALBUTEROL SULFATE 90 UG/1
AEROSOL, METERED RESPIRATORY (INHALATION)
Qty: 3 EACH | Refills: 3 | Status: SHIPPED | OUTPATIENT
Start: 2024-02-16

## 2024-04-30 ENCOUNTER — OFFICE VISIT (OUTPATIENT)
Dept: PULMONOLOGY | Age: 74
End: 2024-04-30
Payer: COMMERCIAL

## 2024-04-30 VITALS
WEIGHT: 179 LBS | DIASTOLIC BLOOD PRESSURE: 70 MMHG | SYSTOLIC BLOOD PRESSURE: 138 MMHG | BODY MASS INDEX: 21.8 KG/M2 | HEART RATE: 85 BPM | HEIGHT: 76 IN | OXYGEN SATURATION: 94 %

## 2024-04-30 DIAGNOSIS — G47.34 NOCTURNAL HYPOXEMIA: ICD-10-CM

## 2024-04-30 DIAGNOSIS — R06.02 SHORTNESS OF BREATH: ICD-10-CM

## 2024-04-30 DIAGNOSIS — J44.9 STAGE 4 VERY SEVERE COPD BY GOLD CLASSIFICATION (HCC): Primary | ICD-10-CM

## 2024-04-30 DIAGNOSIS — J43.9 PULMONARY EMPHYSEMA, UNSPECIFIED EMPHYSEMA TYPE (HCC): ICD-10-CM

## 2024-04-30 DIAGNOSIS — Z87.891 FORMER SMOKER: ICD-10-CM

## 2024-04-30 PROCEDURE — 1123F ACP DISCUSS/DSCN MKR DOCD: CPT | Performed by: INTERNAL MEDICINE

## 2024-04-30 PROCEDURE — 1036F TOBACCO NON-USER: CPT | Performed by: INTERNAL MEDICINE

## 2024-04-30 PROCEDURE — 3023F SPIROM DOC REV: CPT | Performed by: INTERNAL MEDICINE

## 2024-04-30 PROCEDURE — G8420 CALC BMI NORM PARAMETERS: HCPCS | Performed by: INTERNAL MEDICINE

## 2024-04-30 PROCEDURE — G8427 DOCREV CUR MEDS BY ELIG CLIN: HCPCS | Performed by: INTERNAL MEDICINE

## 2024-04-30 PROCEDURE — 3017F COLORECTAL CA SCREEN DOC REV: CPT | Performed by: INTERNAL MEDICINE

## 2024-04-30 PROCEDURE — 99213 OFFICE O/P EST LOW 20 MIN: CPT | Performed by: INTERNAL MEDICINE

## 2024-04-30 NOTE — PROGRESS NOTES
SUBJECTIVE:  Chief Complaint: Very severe/stage IV COPD, pulmonary emphysema, nocturnal hypoxemia on oxygen, shortness of breath, former smoker  Andre states that he had no episodes of bronchitis during the winter but always remains very short of breath even with minimal exertion.  He continues on Breztri 2 inhalations twice a day and has albuterol to use as needed during the day per MDI and albuterol solution for his nebulizer.  He denies chest pain or chest discomfort.  He has had no episodes hemoptysis and no recent sputum expectoration which is purulent but he does cough and expectorate sputum daily.  He continues to wear oxygen at nighttime for his history of nocturnal hypoxemia.  He got no vaccines this fall.  He did not get the low-dose CT lung screening I have ordered last fall.  He states that he was not called to have it scheduled    ROS:  Constitution:  HEENT: Negative for ear, throat pain  Cardiovascular: Negative for chest pain, syncope, edema  Pulmonary: See HPI  Musculoskeletal: Negative for DVT, myalgias, arthralgias    OBJECTIVE:  /70   Pulse 85   Ht 1.93 m (6' 4\")   Wt 81.2 kg (179 lb)   SpO2 94%   BMI 21.79 kg/m²      Physical Exam:  Constitutional:  He appears well developed and well-nourished.  Thin but not cachectic.  Always appears to be mildly short of breath even at rest.  Not using accessory muscles respiration  Neck:  Supple, No palpable lymphadenopathy, No JVD  Cardiovascular:  S1, S2 Normal, Regular rhythm, no murmurs or gallops, No pericardial  rubs.  Pulmonary: Breath sounds are diminished throughout all lung areas without wheezing or rhonchi  Abdomen: Not examined  Extremities: no edema, No DVT  Neurologic: Oriented x3, No focal deficits    Radiology: No recent chest x-ray or CT chest  PFT: Office spirometry 9/7/2021 demonstrated a severe to borderline very severe obstructive lung defect with no significant response to bronchodilators      Echocardiogram: No echo recently

## 2024-10-15 ENCOUNTER — OFFICE VISIT (OUTPATIENT)
Dept: FAMILY MEDICINE CLINIC | Age: 74
End: 2024-10-15
Payer: COMMERCIAL

## 2024-10-15 ENCOUNTER — OFFICE VISIT (OUTPATIENT)
Dept: PULMONOLOGY | Age: 74
End: 2024-10-15
Payer: COMMERCIAL

## 2024-10-15 VITALS
TEMPERATURE: 98.1 F | WEIGHT: 179.4 LBS | SYSTOLIC BLOOD PRESSURE: 170 MMHG | OXYGEN SATURATION: 92 % | DIASTOLIC BLOOD PRESSURE: 82 MMHG | BODY MASS INDEX: 21.84 KG/M2 | HEART RATE: 80 BPM

## 2024-10-15 VITALS
HEART RATE: 87 BPM | BODY MASS INDEX: 21.8 KG/M2 | HEIGHT: 76 IN | OXYGEN SATURATION: 97 % | WEIGHT: 179 LBS | DIASTOLIC BLOOD PRESSURE: 80 MMHG | SYSTOLIC BLOOD PRESSURE: 180 MMHG

## 2024-10-15 DIAGNOSIS — J43.9 PULMONARY EMPHYSEMA, UNSPECIFIED EMPHYSEMA TYPE (HCC): ICD-10-CM

## 2024-10-15 DIAGNOSIS — G47.34 NOCTURNAL HYPOXEMIA: ICD-10-CM

## 2024-10-15 DIAGNOSIS — Z87.891 FORMER SMOKER: ICD-10-CM

## 2024-10-15 DIAGNOSIS — J44.9 CHRONIC OBSTRUCTIVE PULMONARY DISEASE, UNSPECIFIED COPD TYPE (HCC): ICD-10-CM

## 2024-10-15 DIAGNOSIS — R03.0 ELEVATED BLOOD PRESSURE READING: ICD-10-CM

## 2024-10-15 DIAGNOSIS — R06.02 SHORTNESS OF BREATH: ICD-10-CM

## 2024-10-15 DIAGNOSIS — J44.9 STAGE 4 VERY SEVERE COPD BY GOLD CLASSIFICATION (HCC): Primary | ICD-10-CM

## 2024-10-15 DIAGNOSIS — Z93.3 COLOSTOMY PRESENT (HCC): Primary | ICD-10-CM

## 2024-10-15 PROCEDURE — G8427 DOCREV CUR MEDS BY ELIG CLIN: HCPCS | Performed by: NURSE PRACTITIONER

## 2024-10-15 PROCEDURE — 1123F ACP DISCUSS/DSCN MKR DOCD: CPT | Performed by: NURSE PRACTITIONER

## 2024-10-15 PROCEDURE — 1123F ACP DISCUSS/DSCN MKR DOCD: CPT | Performed by: INTERNAL MEDICINE

## 2024-10-15 PROCEDURE — G8484 FLU IMMUNIZE NO ADMIN: HCPCS | Performed by: INTERNAL MEDICINE

## 2024-10-15 PROCEDURE — 3017F COLORECTAL CA SCREEN DOC REV: CPT | Performed by: INTERNAL MEDICINE

## 2024-10-15 PROCEDURE — G8427 DOCREV CUR MEDS BY ELIG CLIN: HCPCS | Performed by: INTERNAL MEDICINE

## 2024-10-15 PROCEDURE — 3017F COLORECTAL CA SCREEN DOC REV: CPT | Performed by: NURSE PRACTITIONER

## 2024-10-15 PROCEDURE — G8420 CALC BMI NORM PARAMETERS: HCPCS | Performed by: NURSE PRACTITIONER

## 2024-10-15 PROCEDURE — 1036F TOBACCO NON-USER: CPT | Performed by: NURSE PRACTITIONER

## 2024-10-15 PROCEDURE — 3023F SPIROM DOC REV: CPT | Performed by: INTERNAL MEDICINE

## 2024-10-15 PROCEDURE — G8484 FLU IMMUNIZE NO ADMIN: HCPCS | Performed by: NURSE PRACTITIONER

## 2024-10-15 PROCEDURE — G8420 CALC BMI NORM PARAMETERS: HCPCS | Performed by: INTERNAL MEDICINE

## 2024-10-15 PROCEDURE — 99213 OFFICE O/P EST LOW 20 MIN: CPT | Performed by: INTERNAL MEDICINE

## 2024-10-15 PROCEDURE — 1036F TOBACCO NON-USER: CPT | Performed by: INTERNAL MEDICINE

## 2024-10-15 PROCEDURE — 99202 OFFICE O/P NEW SF 15 MIN: CPT | Performed by: NURSE PRACTITIONER

## 2024-10-15 RX ORDER — ALBUTEROL SULFATE 90 UG/1
INHALANT RESPIRATORY (INHALATION)
Qty: 1 EACH | Refills: 11 | Status: SHIPPED | OUTPATIENT
Start: 2024-10-15

## 2024-10-15 RX ORDER — BUDESONIDE, GLYCOPYRROLATE, AND FORMOTEROL FUMARATE 160; 9; 4.8 UG/1; UG/1; UG/1
2 AEROSOL, METERED RESPIRATORY (INHALATION) 2 TIMES DAILY
Qty: 1 EACH | Refills: 11 | Status: SHIPPED | OUTPATIENT
Start: 2024-10-15

## 2024-10-15 RX ORDER — ALBUTEROL SULFATE 0.83 MG/ML
SOLUTION RESPIRATORY (INHALATION)
Qty: 360 ML | Refills: 11 | Status: SHIPPED | OUTPATIENT
Start: 2024-10-15

## 2024-10-15 ASSESSMENT — ENCOUNTER SYMPTOMS
NAUSEA: 0
COUGH: 0
SINUS PRESSURE: 0
SORE THROAT: 0
VOMITING: 0
WHEEZING: 0
RHINORRHEA: 0
SINUS PAIN: 0
CHEST TIGHTNESS: 0
SHORTNESS OF BREATH: 0
DIARRHEA: 0

## 2024-10-15 NOTE — PROGRESS NOTES
Raymon Segovia   74 y.o.  male  1958      Chief Complaint   Patient presents with    Other     Needing colostomy supplies        Subjective:  74 y.o.male is here for a follow up. He has the following chronic/acute medical problems:  Patient Active Problem List   Diagnosis    Rotator cuff tendinitis    AC (acromioclavicular) arthritis    Status post rotator cuff repair    Shortness of breath    Emphysema of lung (HCC)    Former smoker    Stage 4 very severe COPD by GOLD classification ([facility])    Nocturnal hypoxemia    Varicose veins of both legs with edema    Weight loss       [facility] - Patient states he is needing colostomy supplies. Patient states they normally faxed over an order form to his PCP. Patient states his PCP left and he does not establish care until 12/11/2024.    Reference # [id number] and # [id number] - for supplies        Review of Systems   Constitutional:  Negative for appetite change, chills, fatigue and fever.   HENT:  Negative for congestion, ear pain, postnasal drip, rhinorrhea, sinus pressure, sinus pain, sneezing and sore throat.    Respiratory:  Negative for cough, chest tightness, shortness of breath and wheezing.    Cardiovascular:  Negative for chest pain and palpitations.   Gastrointestinal:  Negative for diarrhea, nausea and vomiting.   Skin:  Negative for rash.   Neurological:  Negative for dizziness, light-headedness and headaches.       Current Outpatient Medications   Medication Sig Dispense Refill    Budeson-Glycopyrrol-Formoterol (BREZTRI AEROSPHERE) 160-9-4.8 MCG/ACT AERO Inhale 2 actuation into the lungs 2 times daily 1 each 11    albuterol sulfate HFA (PROVENTIL;VENTOLIN;PROAIR) 108 (90 Base) MCG/ACT inhaler TAKE 2 PUFFS BY MOUTH EVERY 6 HOURS AS NEEDED FOR WHEEZE 1 each 11    albuterol (PROVENTIL) (2.5 MG/3ML) 0.083% nebulizer solution USE 1 VIAL IN NEBULIZER EVERY 6 HOURS AS NEEDED FOR WHEEZING 360 mL 11    Handicap Placard MISC 1 each by Does not apply route daily Exp.

## 2024-10-15 NOTE — PROGRESS NOTES
F/u with orthopedics with continued pain- Dr. Rueda  For possible further testing of leg pain  
unspecified emphysema type (HCC)    4. Nocturnal hypoxemia    5. Shortness of breath    6. Former smoker          PLAN:  I am going to give him a sample of Trelegy 100 1 inhalation daily to see if that is easier for him to use and is effective as Breztri.  I did renew his other inhaled medications.  I also reordered a low-dose CT lung screen and signed for a handicap sticker.  [facility] will continue to follow Raymon.    We have discussed the need to maintain yearly flu immunization, pneumococcal vaccination. We have discussed Coronavirus precaution including handwashing practice, wiping items touched in public such as gas pumps, door handles, shopping carts, etc. Self monitoring for infection - fever, chills, cough, SOB. Should they develop symptoms they should call office for further instructions.    Return in about 4 months (around 2/15/2025) for Recheck for COPD, Recheck for Shortness of Breath, Recheck for emphysema.      This dictation was performed with a verbal recognition program and it was checked for errors.  It is possible that there are still dictated errors within this office note.  Any errors should be brought immediately to my attention for correction.  All efforts were made to ensure that this office note is accurate.

## 2024-10-28 ENCOUNTER — TELEPHONE (OUTPATIENT)
Dept: PULMONOLOGY | Age: 74
End: 2024-10-28

## 2024-10-28 NOTE — TELEPHONE ENCOUNTER
Pt called stating his Trelegy sample had one puff left. I called [facility] to call in a verbal for the pt. I asked they run it as a monthly and 90 day. Both were 100% covered. I asked they fill the 90 day and the pt can decide after if he would like to do a monthly supply instead.

## 2025-02-13 ENCOUNTER — TELEPHONE (OUTPATIENT)
Dept: PULMONOLOGY | Age: 75
End: 2025-02-13

## 2025-02-13 NOTE — TELEPHONE ENCOUNTER
Patient called to request refills for Proventil neb sol. Patient also states that he does not feel as though Trelegy is helping or doing anything to alleviate symptoms. Patient is wanting to know if he may be switched to another inhaler       Marii paige

## 2025-02-14 NOTE — TELEPHONE ENCOUNTER
Called patient to put him on Dr. Astudillo's schedule to discuss inhalers.  Unable to reach patient or leave a message.

## 2025-02-17 DIAGNOSIS — J44.9 CHRONIC OBSTRUCTIVE PULMONARY DISEASE, UNSPECIFIED COPD TYPE (HCC): ICD-10-CM

## 2025-02-18 ENCOUNTER — TELEPHONE (OUTPATIENT)
Dept: PULMONOLOGY | Age: 75
End: 2025-02-18

## 2025-02-18 DIAGNOSIS — Z87.891 FORMER SMOKER: ICD-10-CM

## 2025-02-18 DIAGNOSIS — J44.9 STAGE 4 VERY SEVERE COPD BY GOLD CLASSIFICATION (HCC): Primary | ICD-10-CM

## 2025-02-18 DIAGNOSIS — J43.9 PULMONARY EMPHYSEMA, UNSPECIFIED EMPHYSEMA TYPE (HCC): ICD-10-CM

## 2025-02-18 DIAGNOSIS — G47.34 NOCTURNAL HYPOXEMIA: ICD-10-CM

## 2025-02-18 DIAGNOSIS — R06.02 SHORTNESS OF BREATH: ICD-10-CM

## 2025-02-18 RX ORDER — ALBUTEROL SULFATE 0.83 MG/ML
SOLUTION RESPIRATORY (INHALATION)
Qty: 360 ML | Refills: 11 | Status: SHIPPED | OUTPATIENT
Start: 2025-02-18

## 2025-02-18 NOTE — TELEPHONE ENCOUNTER
Please call in Breztri for pt. I will call and let him know. Or if you would like I can see if he wants to  a sample.

## 2025-02-21 RX ORDER — REVEFENACIN 175 UG/3ML
1 SOLUTION RESPIRATORY (INHALATION) DAILY
Qty: 30 EACH | Refills: 11 | Status: SHIPPED | OUTPATIENT
Start: 2025-02-21

## 2025-02-21 RX ORDER — FORMOTEROL FUMARATE 20 UG/2ML
20 SOLUTION RESPIRATORY (INHALATION)
Qty: 120 ML | Refills: 5 | Status: SHIPPED | OUTPATIENT
Start: 2025-02-21 | End: 2025-08-20

## 2025-02-21 NOTE — TELEPHONE ENCOUNTER
Mr. Segovia has failed on multiple inhaled therapies including Trelegy, Breztri.  He has difficulty with both MDI aerosol use and with powdered medications.  I am going to switch him to a combination of performist and Yupelri nebulized solution to see if that works for him

## 2025-02-25 DIAGNOSIS — J44.9 CHRONIC OBSTRUCTIVE PULMONARY DISEASE, UNSPECIFIED COPD TYPE (HCC): Primary | ICD-10-CM

## 2025-02-25 RX ORDER — REVEFENACIN 175 UG/3ML
1 SOLUTION RESPIRATORY (INHALATION) DAILY
Qty: 30 EACH | Refills: 11 | Status: SHIPPED | OUTPATIENT
Start: 2025-02-25

## 2025-02-25 RX ORDER — FORMOTEROL FUMARATE 20 UG/2ML
20 SOLUTION RESPIRATORY (INHALATION)
Qty: 120 ML | Refills: 5 | Status: SHIPPED | OUTPATIENT
Start: 2025-02-25 | End: 2025-08-24

## 2025-03-18 DIAGNOSIS — J44.9 CHRONIC OBSTRUCTIVE PULMONARY DISEASE, UNSPECIFIED COPD TYPE (HCC): ICD-10-CM

## 2025-03-19 RX ORDER — ALBUTEROL SULFATE 0.83 MG/ML
SOLUTION RESPIRATORY (INHALATION)
Qty: 360 ML | Refills: 0 | Status: SHIPPED | OUTPATIENT
Start: 2025-03-19

## 2025-04-28 ENCOUNTER — OFFICE VISIT (OUTPATIENT)
Dept: PULMONOLOGY | Age: 75
End: 2025-04-28
Payer: COMMERCIAL

## 2025-04-28 VITALS
OXYGEN SATURATION: 95 % | BODY MASS INDEX: 21.92 KG/M2 | WEIGHT: 180 LBS | HEART RATE: 81 BPM | HEIGHT: 76 IN | SYSTOLIC BLOOD PRESSURE: 124 MMHG | DIASTOLIC BLOOD PRESSURE: 70 MMHG

## 2025-04-28 DIAGNOSIS — Z87.891 FORMER SMOKER: ICD-10-CM

## 2025-04-28 DIAGNOSIS — J44.9 STAGE 4 VERY SEVERE COPD BY GOLD CLASSIFICATION (HCC): Primary | ICD-10-CM

## 2025-04-28 DIAGNOSIS — R06.02 SHORTNESS OF BREATH: ICD-10-CM

## 2025-04-28 DIAGNOSIS — G47.34 NOCTURNAL HYPOXEMIA: ICD-10-CM

## 2025-04-28 DIAGNOSIS — J43.9 PULMONARY EMPHYSEMA, UNSPECIFIED EMPHYSEMA TYPE (HCC): ICD-10-CM

## 2025-04-28 PROCEDURE — 3017F COLORECTAL CA SCREEN DOC REV: CPT | Performed by: INTERNAL MEDICINE

## 2025-04-28 PROCEDURE — 1160F RVW MEDS BY RX/DR IN RCRD: CPT | Performed by: INTERNAL MEDICINE

## 2025-04-28 PROCEDURE — 1036F TOBACCO NON-USER: CPT | Performed by: INTERNAL MEDICINE

## 2025-04-28 PROCEDURE — 1159F MED LIST DOCD IN RCRD: CPT | Performed by: INTERNAL MEDICINE

## 2025-04-28 PROCEDURE — 99213 OFFICE O/P EST LOW 20 MIN: CPT | Performed by: INTERNAL MEDICINE

## 2025-04-28 PROCEDURE — G8427 DOCREV CUR MEDS BY ELIG CLIN: HCPCS | Performed by: INTERNAL MEDICINE

## 2025-04-28 PROCEDURE — 1123F ACP DISCUSS/DSCN MKR DOCD: CPT | Performed by: INTERNAL MEDICINE

## 2025-04-28 PROCEDURE — 3023F SPIROM DOC REV: CPT | Performed by: INTERNAL MEDICINE

## 2025-04-28 PROCEDURE — G8420 CALC BMI NORM PARAMETERS: HCPCS | Performed by: INTERNAL MEDICINE

## 2025-04-28 RX ORDER — PREDNISONE 5 MG/1
5 TABLET ORAL DAILY
Qty: 30 TABLET | Refills: 11 | Status: SHIPPED | OUTPATIENT
Start: 2025-04-28 | End: 2026-04-23

## 2025-04-28 RX ORDER — PREDNISONE 5 MG/1
TABLET ORAL
Qty: 38 TABLET | Refills: 0 | Status: SHIPPED | OUTPATIENT
Start: 2025-04-28

## 2025-04-28 NOTE — PROGRESS NOTES
significant response to bronchodilators      Echocardiogram: No echo available  Home oxygen saturation study:  O2 saturation on room air at rest-94%  O2 saturation on room air with ambulation-90%.  Unfortunately could not walk far enough to determine whether he had any further O2 desaturation  ASSESSMENT:    1. Stage 4 very severe COPD by GOLD classification ([facility])    2. Nocturnal hypoxemia    3. Pulmonary emphysema, unspecified emphysema type ([facility])    4. Shortness of breath    5. Former smoker          PLAN:  Terence is very compliant and is refusing any types of vaccinations or further testing.  He also has refused referral to [facility] for possible endobronchial valve placement and evaluation for possible lung transplantation or lung volume reduction surgery.  I am going to put him on a tapering course of oral prednisone and then start him on 5 mg prednisone daily.  I will try to determine what dosage of Trelegy he is currently on.  He does not qualify for oxygen 24 hours a day at this time but would benefit from its use and recommend he might want to consider buying a portable oxygen unit on his own.  Also we could retest him in the near future to see if he does qualify with ambulation.  We also could do a 6-minute walk test at [facility].  [facility] will continue to follow him    We have discussed the need to maintain yearly flu immunization, pneumococcal vaccination. We have discussed Coronavirus precaution including handwashing practice, wiping items touched in public such as gas pumps, door handles, shopping carts, etc. Self monitoring for infection - fever, chills, cough, SOB. Should they develop symptoms they should call office for further instructions.    Return in about 6 months (around 10/28/2025) for Recheck for COPD, Recheck for Shortness of Breath, Recheck for emphysema.      This dictation was performed with a verbal recognition program and it was checked for errors.  It is possible that there are